# Patient Record
Sex: FEMALE | Race: BLACK OR AFRICAN AMERICAN | NOT HISPANIC OR LATINO | URBAN - METROPOLITAN AREA
[De-identification: names, ages, dates, MRNs, and addresses within clinical notes are randomized per-mention and may not be internally consistent; named-entity substitution may affect disease eponyms.]

---

## 2017-03-02 ENCOUNTER — EMERGENCY (EMERGENCY)
Facility: HOSPITAL | Age: 52
LOS: 1 days | Discharge: HOME | End: 2017-03-02
Attending: EMERGENCY MEDICINE
Payer: COMMERCIAL

## 2017-03-02 VITALS
OXYGEN SATURATION: 98 % | WEIGHT: 199.96 LBS | HEART RATE: 80 BPM | SYSTOLIC BLOOD PRESSURE: 117 MMHG | RESPIRATION RATE: 18 BRPM | DIASTOLIC BLOOD PRESSURE: 75 MMHG | HEIGHT: 64 IN | TEMPERATURE: 98 F

## 2017-03-02 DIAGNOSIS — M25.561 PAIN IN RIGHT KNEE: ICD-10-CM

## 2017-03-02 PROCEDURE — 99283 EMERGENCY DEPT VISIT LOW MDM: CPT | Mod: 25

## 2017-03-02 PROCEDURE — 73562 X-RAY EXAM OF KNEE 3: CPT

## 2017-03-02 PROCEDURE — 73562 X-RAY EXAM OF KNEE 3: CPT | Mod: 26,RT

## 2017-03-02 NOTE — ED PROVIDER NOTE - OBJECTIVE STATEMENT
52 y/o female states she was working out two days ago when she felt "pain to her right knee," immediately began experiencing swelling to area which has worsened since then. Denies CP/SOB/calf pain/recent travel. Denies any other complaints.

## 2017-03-02 NOTE — ED PROVIDER NOTE - MUSCULOSKELETAL, MLM
Spine appears normal, range of motion is not limited, no muscle or joint tenderness. right knee- + swelling and tenderness noted over superior lateral aspect, decreased ROM secondary to pain, no calf swelling or tenderness

## 2017-03-02 NOTE — ED PROVIDER NOTE - PROGRESS NOTE DETAILS
results discussed with pt, cx on RICE, motrin/apap prn, f.u with ortho. If any symptoms worsen or any new symptoms occur, return to ED, pt verbalized understanding.

## 2018-01-04 NOTE — ED ADULT NURSE NOTE - IS THE PATIENT ABLE TO BE SCREENED?
Denies known Latex allergy or symptoms of Latex sensitivity.  Medications verified, no changes.  Tobacco use verified.  Pt had total knee replacement 5 wks ago and states to be doing well.      Knees buckled last Friday,felt faint,states she hit her face on the counter at home and cut her left eyebrow.    Blood counts are low,iron is extremely low.  C/o weakness.    Health Maintenance Summary     Topic Due On Due Status Completed On Postpone Until Reason    MAMMOGRAM - BREAST CANCER SCREENING May 21, 2015 Overdue       Immunization - TDAP Pregnancy  Hidden       IMMUNIZATION - DTaP/Tdap/Td May 21, 1979 Overdue       Immunization-Influenza Sep 1, 2017 Postponed Oct 27, 2015 Oct 4, 2018 Patient Refused    Depression Screening Dec 14, 2018 Not Due Dec 14, 2017      Hepatitis C Screening May 21, 2011 Overdue             Reviewed overdue health maintenance topics with patient.               Yes

## 2018-06-29 ENCOUNTER — EMERGENCY (EMERGENCY)
Facility: HOSPITAL | Age: 53
LOS: 1 days | Discharge: LEFT BEFORE DISCHARGE | End: 2018-06-29

## 2018-06-29 VITALS — HEIGHT: 64 IN | WEIGHT: 199.96 LBS

## 2018-06-30 ENCOUNTER — EMERGENCY (EMERGENCY)
Facility: HOSPITAL | Age: 53
LOS: 0 days | Discharge: HOME | End: 2018-06-30
Attending: EMERGENCY MEDICINE | Admitting: EMERGENCY MEDICINE
Payer: COMMERCIAL

## 2018-06-30 VITALS
RESPIRATION RATE: 16 BRPM | HEART RATE: 68 BPM | TEMPERATURE: 98 F | DIASTOLIC BLOOD PRESSURE: 72 MMHG | SYSTOLIC BLOOD PRESSURE: 121 MMHG

## 2018-06-30 VITALS — WEIGHT: 199.96 LBS

## 2018-06-30 DIAGNOSIS — M62.830 MUSCLE SPASM OF BACK: ICD-10-CM

## 2018-06-30 DIAGNOSIS — R10.9 UNSPECIFIED ABDOMINAL PAIN: ICD-10-CM

## 2018-06-30 LAB
ALBUMIN SERPL ELPH-MCNC: 3.8 G/DL — SIGNIFICANT CHANGE UP (ref 3.3–5)
ALP SERPL-CCNC: 55 U/L — SIGNIFICANT CHANGE UP (ref 40–120)
ALT FLD-CCNC: 27 U/L — SIGNIFICANT CHANGE UP (ref 12–78)
ANION GAP SERPL CALC-SCNC: 10 MMOL/L — SIGNIFICANT CHANGE UP (ref 5–17)
APPEARANCE UR: ABNORMAL
AST SERPL-CCNC: 33 U/L — SIGNIFICANT CHANGE UP (ref 15–37)
BASOPHILS # BLD AUTO: 0.01 K/UL — SIGNIFICANT CHANGE UP (ref 0–0.2)
BASOPHILS NFR BLD AUTO: 0.2 % — SIGNIFICANT CHANGE UP (ref 0–2)
BILIRUB SERPL-MCNC: 0.6 MG/DL — SIGNIFICANT CHANGE UP (ref 0.2–1.2)
BILIRUB UR-MCNC: NEGATIVE — SIGNIFICANT CHANGE UP
BUN SERPL-MCNC: 12 MG/DL — SIGNIFICANT CHANGE UP (ref 7–23)
CALCIUM SERPL-MCNC: 9.7 MG/DL — SIGNIFICANT CHANGE UP (ref 8.5–10.1)
CHLORIDE SERPL-SCNC: 102 MMOL/L — SIGNIFICANT CHANGE UP (ref 96–108)
CO2 SERPL-SCNC: 26 MMOL/L — SIGNIFICANT CHANGE UP (ref 22–31)
COLOR SPEC: YELLOW — SIGNIFICANT CHANGE UP
COMMENT - URINE: SIGNIFICANT CHANGE UP
CREAT SERPL-MCNC: 1.04 MG/DL — SIGNIFICANT CHANGE UP (ref 0.5–1.3)
DIFF PNL FLD: ABNORMAL
EOSINOPHIL # BLD AUTO: 0 K/UL — SIGNIFICANT CHANGE UP (ref 0–0.5)
EOSINOPHIL NFR BLD AUTO: 0 % — SIGNIFICANT CHANGE UP (ref 0–6)
EPI CELLS # UR: SIGNIFICANT CHANGE UP
GLUCOSE SERPL-MCNC: 146 MG/DL — HIGH (ref 70–99)
GLUCOSE UR QL: NEGATIVE MG/DL — SIGNIFICANT CHANGE UP
HCT VFR BLD CALC: 45.2 % — HIGH (ref 34.5–45)
HGB BLD-MCNC: 14.3 G/DL — SIGNIFICANT CHANGE UP (ref 11.5–15.5)
IMM GRANULOCYTES NFR BLD AUTO: 0.5 % — SIGNIFICANT CHANGE UP (ref 0–1.5)
KETONES UR-MCNC: ABNORMAL
LEUKOCYTE ESTERASE UR-ACNC: NEGATIVE — SIGNIFICANT CHANGE UP
LYMPHOCYTES # BLD AUTO: 0.97 K/UL — LOW (ref 1–3.3)
LYMPHOCYTES # BLD AUTO: 16.1 % — SIGNIFICANT CHANGE UP (ref 13–44)
MCHC RBC-ENTMCNC: 25.7 PG — LOW (ref 27–34)
MCHC RBC-ENTMCNC: 31.6 GM/DL — LOW (ref 32–36)
MCV RBC AUTO: 81.1 FL — SIGNIFICANT CHANGE UP (ref 80–100)
MONOCYTES # BLD AUTO: 0.05 K/UL — SIGNIFICANT CHANGE UP (ref 0–0.9)
MONOCYTES NFR BLD AUTO: 0.8 % — LOW (ref 2–14)
NEUTROPHILS # BLD AUTO: 4.97 K/UL — SIGNIFICANT CHANGE UP (ref 1.8–7.4)
NEUTROPHILS NFR BLD AUTO: 82.4 % — HIGH (ref 43–77)
NITRITE UR-MCNC: NEGATIVE — SIGNIFICANT CHANGE UP
NRBC # BLD: 0 /100 WBCS — SIGNIFICANT CHANGE UP (ref 0–0)
PH UR: 6 — SIGNIFICANT CHANGE UP (ref 5–8)
PLATELET # BLD AUTO: 329 K/UL — SIGNIFICANT CHANGE UP (ref 150–400)
POTASSIUM SERPL-MCNC: 4 MMOL/L — SIGNIFICANT CHANGE UP (ref 3.5–5.3)
POTASSIUM SERPL-SCNC: 4 MMOL/L — SIGNIFICANT CHANGE UP (ref 3.5–5.3)
PROT SERPL-MCNC: 8.5 GM/DL — HIGH (ref 6–8.3)
PROT UR-MCNC: 30 MG/DL
RBC # BLD: 5.57 M/UL — HIGH (ref 3.8–5.2)
RBC # FLD: 13.6 % — SIGNIFICANT CHANGE UP (ref 10.3–14.5)
RBC CASTS # UR COMP ASSIST: SIGNIFICANT CHANGE UP /HPF (ref 0–4)
SODIUM SERPL-SCNC: 138 MMOL/L — SIGNIFICANT CHANGE UP (ref 135–145)
SP GR SPEC: 1.02 — SIGNIFICANT CHANGE UP (ref 1.01–1.02)
UROBILINOGEN FLD QL: NEGATIVE MG/DL — SIGNIFICANT CHANGE UP
WBC # BLD: 6.03 K/UL — SIGNIFICANT CHANGE UP (ref 3.8–10.5)
WBC # FLD AUTO: 6.03 K/UL — SIGNIFICANT CHANGE UP (ref 3.8–10.5)
WBC UR QL: NEGATIVE — SIGNIFICANT CHANGE UP

## 2018-06-30 PROCEDURE — 99284 EMERGENCY DEPT VISIT MOD MDM: CPT | Mod: 25

## 2018-06-30 PROCEDURE — 74176 CT ABD & PELVIS W/O CONTRAST: CPT | Mod: 26

## 2018-06-30 RX ORDER — KETOROLAC TROMETHAMINE 30 MG/ML
30 SYRINGE (ML) INJECTION ONCE
Qty: 0 | Refills: 0 | Status: DISCONTINUED | OUTPATIENT
Start: 2018-06-30 | End: 2018-06-30

## 2018-06-30 RX ORDER — SODIUM CHLORIDE 9 MG/ML
1000 INJECTION INTRAMUSCULAR; INTRAVENOUS; SUBCUTANEOUS ONCE
Qty: 0 | Refills: 0 | Status: COMPLETED | OUTPATIENT
Start: 2018-06-30 | End: 2018-06-30

## 2018-06-30 RX ORDER — SODIUM CHLORIDE 9 MG/ML
3 INJECTION INTRAMUSCULAR; INTRAVENOUS; SUBCUTANEOUS ONCE
Qty: 0 | Refills: 0 | Status: DISCONTINUED | OUTPATIENT
Start: 2018-06-30 | End: 2018-06-30

## 2018-06-30 RX ORDER — MAGNESIUM CHLORIDE
0 CRYSTALS MISCELLANEOUS
Qty: 0 | Refills: 0 | COMMUNITY

## 2018-06-30 RX ORDER — SODIUM CHLORIDE 9 MG/ML
1000 INJECTION INTRAMUSCULAR; INTRAVENOUS; SUBCUTANEOUS ONCE
Qty: 0 | Refills: 0 | Status: DISCONTINUED | OUTPATIENT
Start: 2018-06-30 | End: 2018-06-30

## 2018-06-30 RX ORDER — CYCLOBENZAPRINE HYDROCHLORIDE 10 MG/1
1 TABLET, FILM COATED ORAL
Qty: 21 | Refills: 0 | OUTPATIENT
Start: 2018-06-30 | End: 2018-07-06

## 2018-06-30 RX ADMIN — SODIUM CHLORIDE 1000 MILLILITER(S): 9 INJECTION INTRAMUSCULAR; INTRAVENOUS; SUBCUTANEOUS at 08:11

## 2018-06-30 RX ADMIN — Medication 30 MILLIGRAM(S): at 08:11

## 2018-06-30 NOTE — ED PROVIDER NOTE - OBJECTIVE STATEMENT
54 yo female pw left flank pain for 2 dasy no fever or chills, no dysuria. no ho traumatic injury 54 yo female pw left flank pain for 2 dasy no fever or chills, no dysuria. no ho traumatic injury. pt was triaged in [facility] last night for the same complaint but left wtihout being seen by the doctor. pt took motrin and flexeril with relief of pain then return of pain today

## 2018-06-30 NOTE — ED PROVIDER NOTE - CROS ED ROS STATEMENT
Patient is a 71y old  Female who presents with a chief complaint of shortness of breath (2018 21:46)      SUBJECTIVE / OVERNIGHT EVENTS: lethargic laboured breathing.  Review of Systems  unobtainable     MEDICATIONS  (STANDING):  albumin human 25% IVPB 100 milliLiter(s) IV Intermittent every 6 hours  ALBUTerol/ipratropium for Nebulization 3 milliLiter(s) Nebulizer every 6 hours  buDESOnide   0.5 milliGRAM(s) Respule 0.5 milliGRAM(s) Inhalation every 12 hours  cefepime  IVPB 1000 milliGRAM(s) IV Intermittent every 12 hours  dextrose 5%. 1000 milliLiter(s) (50 mL/Hr) IV Continuous <Continuous>  dextrose 50% Injectable 12.5 Gram(s) IV Push once  dextrose 50% Injectable 25 Gram(s) IV Push once  dextrose 50% Injectable 25 Gram(s) IV Push once  docusate sodium 100 milliGRAM(s) Oral three times a day  escitalopram 20 milliGRAM(s) Oral daily  furosemide   Injectable 60 milliGRAM(s) IV Push daily  insulin glargine Injectable (LANTUS) 32 Unit(s) SubCutaneous at bedtime  insulin lispro (HumaLOG) corrective regimen sliding scale   SubCutaneous every 6 hours  methylPREDNISolone sodium succinate Injectable 125 milliGRAM(s) IV Push once  midodrine 10 milliGRAM(s) Oral every 8 hours  nystatin Powder 1 Application(s) Topical two times a day  pantoprazole    Tablet 40 milliGRAM(s) Oral before breakfast  predniSONE   Tablet 50 milliGRAM(s) Oral daily  vancomycin  IVPB 1000 milliGRAM(s) IV Intermittent once    MEDICATIONS  (PRN):  dextrose Gel 1 Dose(s) Oral once PRN Blood Glucose LESS THAN 70 milliGRAM(s)/deciliter  glucagon  Injectable 1 milliGRAM(s) IntraMuscular once PRN Glucose LESS THAN 70 milligrams/deciliter  ondansetron Injectable 4 milliGRAM(s) IV Push every 6 hours PRN Nausea and/or Vomiting  polyethylene glycol 3350 17 Gram(s) Oral at bedtime PRN Constipation  senna 2 Tablet(s) Oral at bedtime PRN Constipation  sodium chloride 0.65% Nasal 1 Spray(s) Both Nostrils daily PRN Nasal Congestion          PHYSICAL EXAM:  GENERAL: NAD, well-developed  HEAD:  Atraumatic, Normocephalic  EYES: EOMI, PERRLA, conjunctiva and sclera jaundiced  NECK: Supple, No JVD  CHEST/LUNG: few wheezes and bilateral basal crackles  HEART:tachycardic  ABDOMEN: Soft, Nontender, Nondistended; Bowel sounds present  EXTREMITIES:  2+ Peripheral Pulses, No clubbing, cyanosis,3+edema  PSYCH: AAOx3  NEUROLOGY: non-focal  SKIN: No rashes or lesions    LABS:                        8.7    7.4   )-----------( 38       ( 2018 07:33 )             27.1     02-    147<H>  |  101  |  126<H>  ----------------------------<  136<H>  3.6   |  24  |  2.50<H>    Ca    9.7      2018 07:33    TPro  5.4<L>  /  Alb  3.8  /  TBili  10.3<H>  /  DBili  x   /  AST  88<H>  /  ALT  46<H>  /  AlkPhos  156<H>            Urinalysis Basic - ( 2018 16:39 )    Color: Yellow / Appearance: Clear / S.012 / pH: x  Gluc: x / Ketone: Negative  / Bili: Negative / Urobili: Negative   Blood: x / Protein: Trace / Nitrite: Negative   Leuk Esterase: Negative / RBC: 0-2 /HPF / WBC 2-5 /HPF   Sq Epi: x / Non Sq Epi: Occasional /HPF / Bacteria: Few /HPF        RADIOLOGY & ADDITIONAL TESTS:    Imaging Personally Reviewed:    Consultant(s) Notes Reviewed:      Care Discussed with Consultants/Other Providers: all other ROS negative except as per HPI

## 2018-06-30 NOTE — ED PROVIDER NOTE - MUSCULOSKELETAL, MLM
Spine appears normal, range of motion is not limited, left sided flank pain with cvat, spasm to left paralumbar area no rash

## 2018-06-30 NOTE — ED ADULT TRIAGE NOTE - CHIEF COMPLAINT QUOTE
Pt c/o left flank pain since yesterday, pt seen at [facility] yesterday and signed out AMA afterreceiving motrin. Pt states she had no test performed. pt denies urinary symptoms, fever, fall or trauma

## 2018-06-30 NOTE — ED ADULT NURSE NOTE - CHIEF COMPLAINT QUOTE
Pt c/o left flank pain since yesterday, pt seen at [facility] yesterday and signed out AMA afterreceiving motrin. Pt states she had no test performed. pt denies urinary symptoms, fever, fall or trauma

## 2018-06-30 NOTE — ED ADULT NURSE NOTE - OBJECTIVE STATEMENT
pt c/o left mid back and flank pain x1 day, worse with movement, pt reports relief after taking flexeril and ibuprofen

## 2021-02-18 ENCOUNTER — EMERGENCY (EMERGENCY)
Facility: HOSPITAL | Age: 56
LOS: 1 days | Discharge: HOME | End: 2021-02-18
Attending: EMERGENCY MEDICINE
Payer: COMMERCIAL

## 2021-02-18 VITALS
OXYGEN SATURATION: 95 % | HEART RATE: 77 BPM | WEIGHT: 199.96 LBS | DIASTOLIC BLOOD PRESSURE: 90 MMHG | RESPIRATION RATE: 20 BRPM | TEMPERATURE: 98 F | HEIGHT: 72 IN | SYSTOLIC BLOOD PRESSURE: 144 MMHG

## 2021-02-18 PROCEDURE — 99285 EMERGENCY DEPT VISIT HI MDM: CPT

## 2021-02-18 PROCEDURE — 93010 ELECTROCARDIOGRAM REPORT: CPT

## 2021-02-18 RX ORDER — ASPIRIN/CALCIUM CARB/MAGNESIUM 324 MG
162 TABLET ORAL ONCE
Refills: 0 | Status: COMPLETED | OUTPATIENT
Start: 2021-02-18 | End: 2021-02-18

## 2021-02-18 NOTE — ED ADULT TRIAGE NOTE - CHIEF COMPLAINT QUOTE
patient states that she has chest pressure/ tightness with SOB and lower back pain, unknown covid exposure

## 2021-02-18 NOTE — ED PROVIDER NOTE - ATTENDING CONTRIBUTION TO CARE
Exertional chest pain concerning for anginal pain, no recent cardiac work up. ECG with incomplete RBBB, no other obvious ischemic changes. PE ruled out by CTA. Initial troponin negative. Given suspicious nature of presentation will hold on ED obs for serial enzymes, tele monitoring and cardiology consult in the AM.

## 2021-02-18 NOTE — ED PROVIDER NOTE - OBJECTIVE STATEMENT
56yo F no pmhx presents to ED c/o chest pressure. Pt states she was outside shoveling snow this evening, started feeling mid-sternal chest pressure after coming inside. States she gets this pressure from time to time but became concerned tonight when her sister told her to check her pulse ox and it was reading low 90s. Pressure is non-radiating. Pt also states she tested positive for covid on 1/26, was asymptomatic and has been feeling well but sister told her she should come to ED because of risk for blood clot. States she went to [facility] and got aspirin took 162mg prior to arrival. Contrary to triage note pt does not feel short of breath. States back pain feels "achy" from shoveling. Denies sob, GARCIA, palpitations, diaphoresis, dizziness, ha, n/v. 54yo F no pmhx presents to ED c/o chest pressure. Pt states she was outside shoveling snow this evening, started feeling mid-sternal chest pressure after coming inside. States she gets this pressure from time to time but became concerned tonight when her sister told her to check her pulse ox and it was reading low 90s. Pressure is non-radiating. Pt also states she tested positive for covid on 1/26, was asymptomatic and has been feeling well but sister told her she should come to ED because of risk for blood clot. States she went to [facility] and got aspirin took 162mg prior to arrival. Contrary to triage note pt does not feel short of breath. Past couple days has been noting GARCIA, typically when going up stairs. States back pain feels "achy" from shoveling. Denies sob, palpitations, diaphoresis, dizziness, ha, n/v.

## 2021-02-18 NOTE — ED PROVIDER NOTE - PROGRESS NOTE DETAILS
CTA negative for PE, trop negative. Will keep pt in obs for serial enzymes, ekgs and [facility] cardiology consult. pt agreeable to stay

## 2021-02-18 NOTE — ED PROVIDER NOTE - PHYSICAL EXAMINATION
Gen: No acute distress, non toxic  HEENT: Mucous membranes moist, pink conjunctivae, EOMI  CV: RRR, nl s1/s2.  Resp: O2 100% on Room air. CTAB, normal rate and effort  GI: Abdomen soft, NT, ND. No rebound, no guarding  Neuro: A&O x 3, moving all 4 extremities  MSK: No spine or joint tenderness to palpation  Skin: No rashes. intact and perfused.

## 2021-02-18 NOTE — ED PROVIDER NOTE - CLINICAL SUMMARY MEDICAL DECISION MAKING FREE TEXT BOX
54yo F with intermittent chest pressure for past 1-2 days, worse this evening after shoveling snow. Concern for acs given symptoms, plan to check labs including trop, d-dimer due to pt's recent covid infx and cxr. initial ekg showed prolonged , will repeat.

## 2021-02-19 VITALS
SYSTOLIC BLOOD PRESSURE: 130 MMHG | OXYGEN SATURATION: 99 % | DIASTOLIC BLOOD PRESSURE: 88 MMHG | TEMPERATURE: 98 F | RESPIRATION RATE: 16 BRPM | HEART RATE: 73 BPM

## 2021-02-19 LAB
ALBUMIN SERPL ELPH-MCNC: 3.8 G/DL — SIGNIFICANT CHANGE UP (ref 3.3–5.2)
ALP SERPL-CCNC: 65 U/L — SIGNIFICANT CHANGE UP (ref 40–120)
ALT FLD-CCNC: 16 U/L — SIGNIFICANT CHANGE UP
ANION GAP SERPL CALC-SCNC: 11 MMOL/L — SIGNIFICANT CHANGE UP (ref 5–17)
AST SERPL-CCNC: 25 U/L — SIGNIFICANT CHANGE UP
BASOPHILS # BLD AUTO: 0.04 K/UL — SIGNIFICANT CHANGE UP (ref 0–0.2)
BASOPHILS NFR BLD AUTO: 0.5 % — SIGNIFICANT CHANGE UP (ref 0–2)
BILIRUB SERPL-MCNC: 0.2 MG/DL — LOW (ref 0.4–2)
BUN SERPL-MCNC: 16 MG/DL — SIGNIFICANT CHANGE UP (ref 8–20)
CALCIUM SERPL-MCNC: 9.4 MG/DL — SIGNIFICANT CHANGE UP (ref 8.6–10.2)
CHLORIDE SERPL-SCNC: 101 MMOL/L — SIGNIFICANT CHANGE UP (ref 98–107)
CO2 SERPL-SCNC: 27 MMOL/L — SIGNIFICANT CHANGE UP (ref 22–29)
CREAT SERPL-MCNC: 0.76 MG/DL — SIGNIFICANT CHANGE UP (ref 0.5–1.3)
D DIMER BLD IA.RAPID-MCNC: 266 NG/ML DDU — HIGH
EOSINOPHIL # BLD AUTO: 0.3 K/UL — SIGNIFICANT CHANGE UP (ref 0–0.5)
EOSINOPHIL NFR BLD AUTO: 4.1 % — SIGNIFICANT CHANGE UP (ref 0–6)
GLUCOSE SERPL-MCNC: 114 MG/DL — HIGH (ref 70–99)
HCG SERPL-ACNC: <4 MIU/ML — SIGNIFICANT CHANGE UP
HCT VFR BLD CALC: 41.5 % — SIGNIFICANT CHANGE UP (ref 34.5–45)
HGB BLD-MCNC: 12.6 G/DL — SIGNIFICANT CHANGE UP (ref 11.5–15.5)
IMM GRANULOCYTES NFR BLD AUTO: 0.5 % — SIGNIFICANT CHANGE UP (ref 0–1.5)
LIDOCAIN IGE QN: 37 U/L — SIGNIFICANT CHANGE UP (ref 22–51)
LYMPHOCYTES # BLD AUTO: 2.98 K/UL — SIGNIFICANT CHANGE UP (ref 1–3.3)
LYMPHOCYTES # BLD AUTO: 40.5 % — SIGNIFICANT CHANGE UP (ref 13–44)
MAGNESIUM SERPL-MCNC: 2.1 MG/DL — SIGNIFICANT CHANGE UP (ref 1.6–2.6)
MCHC RBC-ENTMCNC: 25.9 PG — LOW (ref 27–34)
MCHC RBC-ENTMCNC: 30.4 GM/DL — LOW (ref 32–36)
MCV RBC AUTO: 85.2 FL — SIGNIFICANT CHANGE UP (ref 80–100)
MONOCYTES # BLD AUTO: 0.55 K/UL — SIGNIFICANT CHANGE UP (ref 0–0.9)
MONOCYTES NFR BLD AUTO: 7.5 % — SIGNIFICANT CHANGE UP (ref 2–14)
NEUTROPHILS # BLD AUTO: 3.45 K/UL — SIGNIFICANT CHANGE UP (ref 1.8–7.4)
NEUTROPHILS NFR BLD AUTO: 46.9 % — SIGNIFICANT CHANGE UP (ref 43–77)
PLATELET # BLD AUTO: 260 K/UL — SIGNIFICANT CHANGE UP (ref 150–400)
POTASSIUM SERPL-MCNC: 4.3 MMOL/L — SIGNIFICANT CHANGE UP (ref 3.5–5.3)
POTASSIUM SERPL-SCNC: 4.3 MMOL/L — SIGNIFICANT CHANGE UP (ref 3.5–5.3)
PROT SERPL-MCNC: 7.2 G/DL — SIGNIFICANT CHANGE UP (ref 6.6–8.7)
RBC # BLD: 4.87 M/UL — SIGNIFICANT CHANGE UP (ref 3.8–5.2)
RBC # FLD: 14.5 % — SIGNIFICANT CHANGE UP (ref 10.3–14.5)
SODIUM SERPL-SCNC: 139 MMOL/L — SIGNIFICANT CHANGE UP (ref 135–145)
TROPONIN T SERPL-MCNC: <0.01 NG/ML — SIGNIFICANT CHANGE UP (ref 0–0.06)
WBC # BLD: 7.36 K/UL — SIGNIFICANT CHANGE UP (ref 3.8–10.5)
WBC # FLD AUTO: 7.36 K/UL — SIGNIFICANT CHANGE UP (ref 3.8–10.5)

## 2021-02-19 PROCEDURE — 85025 COMPLETE CBC W/AUTO DIFF WBC: CPT

## 2021-02-19 PROCEDURE — G1004: CPT

## 2021-02-19 PROCEDURE — 84484 ASSAY OF TROPONIN QUANT: CPT

## 2021-02-19 PROCEDURE — 93017 CV STRESS TEST TRACING ONLY: CPT

## 2021-02-19 PROCEDURE — 93306 TTE W/DOPPLER COMPLETE: CPT

## 2021-02-19 PROCEDURE — 71275 CT ANGIOGRAPHY CHEST: CPT | Mod: 26,ME

## 2021-02-19 PROCEDURE — 78452 HT MUSCLE IMAGE SPECT MULT: CPT

## 2021-02-19 PROCEDURE — 93018 CV STRESS TEST I&R ONLY: CPT

## 2021-02-19 PROCEDURE — G0378: CPT

## 2021-02-19 PROCEDURE — 93016 CV STRESS TEST SUPVJ ONLY: CPT

## 2021-02-19 PROCEDURE — 93010 ELECTROCARDIOGRAM REPORT: CPT | Mod: 76

## 2021-02-19 PROCEDURE — 36415 COLL VENOUS BLD VENIPUNCTURE: CPT

## 2021-02-19 PROCEDURE — 78452 HT MUSCLE IMAGE SPECT MULT: CPT | Mod: 26

## 2021-02-19 PROCEDURE — 93005 ELECTROCARDIOGRAM TRACING: CPT

## 2021-02-19 PROCEDURE — A9500: CPT

## 2021-02-19 PROCEDURE — 93306 TTE W/DOPPLER COMPLETE: CPT | Mod: 26

## 2021-02-19 PROCEDURE — 99284 EMERGENCY DEPT VISIT MOD MDM: CPT | Mod: 25

## 2021-02-19 PROCEDURE — 99236 HOSP IP/OBS SAME DATE HI 85: CPT

## 2021-02-19 PROCEDURE — 84702 CHORIONIC GONADOTROPIN TEST: CPT

## 2021-02-19 PROCEDURE — 71046 X-RAY EXAM CHEST 2 VIEWS: CPT | Mod: 26

## 2021-02-19 PROCEDURE — 71275 CT ANGIOGRAPHY CHEST: CPT

## 2021-02-19 PROCEDURE — 99243 OFF/OP CNSLTJ NEW/EST LOW 30: CPT

## 2021-02-19 PROCEDURE — 36000 PLACE NEEDLE IN VEIN: CPT

## 2021-02-19 PROCEDURE — 71046 X-RAY EXAM CHEST 2 VIEWS: CPT

## 2021-02-19 PROCEDURE — 80053 COMPREHEN METABOLIC PANEL: CPT

## 2021-02-19 PROCEDURE — 85379 FIBRIN DEGRADATION QUANT: CPT

## 2021-02-19 PROCEDURE — 83735 ASSAY OF MAGNESIUM: CPT

## 2021-02-19 PROCEDURE — 83690 ASSAY OF LIPASE: CPT

## 2021-02-19 RX ADMIN — Medication 162 MILLIGRAM(S): at 00:28

## 2021-02-19 NOTE — ED CDU PROVIDER DISPOSITION NOTE - PATIENT PORTAL LINK FT
You can access the FollowMyHealth Patient Portal offered by [facility] by registering at the following website: [email or web address]. By joining [employer]’s FollowMyHealth portal, you will also be able to view your health information using other applications (apps) compatible with our system.

## 2021-02-19 NOTE — ED CDU PROVIDER DISPOSITION NOTE - NSFOLLOWUPINSTRUCTIONS_ED_ALL_ED_FT
- follow up with Dr Hines in 1-2 weeks    - Your CT scan showed enlarged left axillary lymph nodes, you need to make an appointment for a mammogram and follow up with your medical doctor in 2-3 days.

## 2021-02-19 NOTE — ED ADULT NURSE REASSESSMENT NOTE - NS ED NURSE REASSESS COMMENT FT1
Patient pending NST read. Patient remains NPO pending results as per orders. VSS. Patient in NAD. Will continue to monitor.
Received patient from [facility] RN RN.  Pt AxO4, VSS.  Pt denies chest pain/SOB at this time.  Cardio NSR on cardiac monitor. Left AC   IV insertion site with Angio cath #20 intact , flushing without difficulty. Currently denies chest pain or any pressure, denies SOB ,palpitation /n/v or dizziness.  Safety measures taken, bed in low position, call bell within reach, side rails up x2.  Plan of care explained.  Pt verbalized understanding.  Will continue to monitor.
Assumed care of the patient at 0730. Verbal report received from Gloria MANLEY Obs. Patient A&Ox4. No s/s of acute distress. chest pressure subsided at rest, intermittent Chest pressure while ambulating only. Denies SOB or dizziness. VSS. NSR on CM. PIV patent, +blood return. Patient ambulatory with steady gait. Patient remains NPO pending TTE and NST testing. Patient in understanding of plan of care. Patient with no further questions for the RN. Resting in comfort. Call bell within reach and encouraged to use when assistance needed. Will continue to monitor.

## 2021-02-19 NOTE — ED CDU PROVIDER DISPOSITION NOTE - CLINICAL COURSE
54yo F no pmhx presents to ED c/o chest pressure. Pt states she was outside shoveling snow this evening, started feeling mid-sternal chest pressure after coming inside. States she gets this pressure from time to time but became concerned tonight when her sister told her to check her pulse ox and it was reading low 90s. Pressure is non-radiating. Pt also states she tested positive for covid on 1/26, was asymptomatic and has been feeling well but sister told her she should come to ED because of risk for blood clot. States she went to [facility] and got aspirin took 162mg prior to arrival. Contrary to triage note pt does not feel short of breath. Past couple days has been noting GARCIA, typically when going up stairs. States back pain feels "achy" from shoveling. Denies sob, palpitations, diaphoresis, dizziness, ha, n/v.  Troponin negative x 3, EKG without ischemia.  CTA chest was negative for PE.  Incidental finding of enlarged LN left axillary.  Patient states that she has a family hx of breast cancer, was getting mammograms routinely in her late teens and early 20's but has not had one in30 years".  I s/w Mayte Beaulieu NP of cardiology TTE and NST unremarkable, needs to f/u Dr Hines 1-2 weeks.

## 2021-02-19 NOTE — ED CDU PROVIDER INITIAL DAY NOTE - OBJECTIVE STATEMENT
54yo F no pmhx presents to ED c/o chest pressure. Pt states she was outside shoveling snow this evening, started feeling mid-sternal chest pressure after coming inside. States she gets this pressure from time to time but became concerned tonight when her sister told her to check her pulse ox and it was reading low 90s. Pressure is non-radiating. Pt also states she tested positive for covid on 1/26, was asymptomatic and has been feeling well but sister told her she should come to ED because of risk for blood clot. States she went to [facility] and got aspirin took 162mg prior to arrival. Contrary to triage note pt does not feel short of breath. Past couple days has been noting GARCIA, typically when going up stairs. States back pain feels "achy" from shoveling. Denies sob, palpitations, diaphoresis, dizziness, ha, n/v.

## 2021-02-19 NOTE — ED ADULT NURSE REASSESSMENT NOTE - COMFORT CARE
ambulated to bathroom/plan of care explained/side rails up/treatment delay explained/wait time explained/warm blanket provided
ambulated to bathroom/plan of care explained/repositioned/wait time explained

## 2021-02-19 NOTE — ED ADULT NURSE NOTE - OBJECTIVE STATEMENT
received pt alert and orientedx3, complaining of chest pain, labs sent, iV in place, vitals are stable, no chest pain at this time, sating well on room air, continue to monitor

## 2021-02-19 NOTE — ED CDU PROVIDER DISPOSITION NOTE - CARE PROVIDER_API CALL
Ike Baeza)  Cardiovascular Disease; Internal Medicine; Interventional Cardiology  [address] [place] [address]  Phone: [phone]  Fax: [phone]  Follow Up Time:

## 2021-02-19 NOTE — CONSULT NOTE ADULT - ASSESSMENT
Pt is a 54 y/o female with no known past medical history who presents to [facility]ED for chest pain. Pt states for past two days she has been experiencing chest pressure. Pt states pressure started after she shoveled, described as midsternal, constant, radiating intermittently to right side of back, 2-3/10, lasting for hours in duration, better with drinking hot tea. Pt states she has recently noticed with walking for long periods of time she becomes SOB. 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 She told her sister who was a nurse practitioner who told her to ambulate and observe if o2 sat increased which it did. Pt was then advised to seek emergent medical attention. In ED, D-dimer-226, Tropx3-negative, CTA Chest- negative for PE. Pt denies fevers, chills, cough, palpitations.       Chest Pain  -Tropx3-negative  -D-dimer-226  -ECG- NSR HR @ 66, with no ischemic changes  -CTA Chest-negative for PE, no consolidation or pleural effusion  -Pt has family hx of first degree relative (mother) who had MI @ age 30-40s, pt also has heart murmur (previously known as per patient)  -Echo-ordered and pending  -Plan for NST 2/19, please maintain NPO    Assessment and recommendations are final when note is signed by the attending.

## 2021-02-19 NOTE — CONSULT NOTE ADULT - SUBJECTIVE AND OBJECTIVE BOX
[facility]                                                               Office:  [address] [address]                                                              Telephone: [phone]. Fax:[phone]                                                                        CARDIOLOGY CONSULTATION NOTE                                                                                             Consult requested by:  PLACIDO Santiago  Reason for Consultation: Chest Pain  History obtained by: Patient and medical record   obtained: No    Chief complaint:    Patient is a 55y old  Female who presents with a chief complaint of chest pain      HPI: Pt is a 56 y/o female with no known past medical history who presents to [facility]ED for chest pain. Pt states for past two days she has been experiencing chest pressure. Pt states pressure started after she shoveled, described as midsternal, constant, radiating intermittently to right side of back, 2-3/10, lasting for hours in duration, better with drinking hot tea. Pt states she has recently noticed with walking for long periods of time she becomes SOB. 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 She told her sister who was a nurse practitioner who told her to ambulate and observe if o2 sat increased which it did. Pt was then advised to seek emergent medical attention. In ED, D-dimer-226, Tropx3-negative, CTA Chest- negative for PE.  Pt denies fevers, chills, cough, palpitations.         REVIEW OF SYMPTOMS:     CONSTITUTIONAL: No fever, weight loss, or fatigue  ENMT:  No difficulty hearing, tinnitus, vertigo; No sinus or throat pain  NECK: No pain or stiffness  CARDIOVASCULAR: See HPI  RESPIRATORY: No Dyspnea on exertion, Shortness of breath, cough, wheezing  : No dysuria, no hematuria   GI: No dark color stool, no melena, no diarrhea, no constipation, no abdominal pain   NEURO: No headache, no dizziness, no slurred speech   MUSCULOSKELETAL: No joint pain or swelling; No muscle, back, or extremity pain  PSYCH: No agitation, no anxiety.    ALL OTHER REVIEW OF SYSTEMS ARE NEGATIVE.      PREVIOUS DIAGNOSTIC TESTING  ECHO FINDINGS: pending      STRESS FINDINGS: Asp er patient 5-6 years ago-normal      ALLERGIES: Allergies    No Known Allergies    Intolerances          PAST MEDICAL HISTORY  No pertinent past medical history        PAST SURGICAL HISTORY  No significant past surgical history        FAMILY HISTORY:  Mother-MI age 30-40s         SOCIAL HISTORY:  Denies smoking/alcohol/drugs      CURRENT MEDICATIONS:           HOME MEDICATIONS:    magnesium chloride 64 mg oral tablet, extended release:  (30 Jun 2018 08:15)      Vital Signs Last 24 Hrs  T(C): 36.4 (19 Feb 2021 06:36), Max: 36.9 (18 Feb 2021 23:25)  T(F): 97.5 (19 Feb 2021 06:36), Max: 98.4 (18 Feb 2021 23:25)  HR: 55 (19 Feb 2021 06:36) (55 - 77)  BP: 106/62 (19 Feb 2021 06:36) (106/62 - 144/90)  RR: 19 (19 Feb 2021 06:36) (19 - 20)  SpO2: 98% (19 Feb 2021 06:36) (95% - 98%)      PHYSICAL EXAM:  Constitutional: Comfortable . No acute distress.   HEENT: Atraumatic and normocephalic , neck is supple . no JVD. No carotid bruit. PEERL   CNS: A&Ox3. No focal deficits. EOMI.   Lymph Nodes: Cervical : Not palpable.  Respiratory: CTAB  Cardiovascular: S1S2 RRR. +sys. murmur grade III/VI 2nd intercostal space  Gastrointestinal: Soft non-tender and non distended . +Bowel sounds. negative Carter's sign.  Extremities: No edema.   Psychiatric: Calm . no agitation.  Skin: No skin rash/ulcers visualized to face, hands or feet.    Intake and output:     LABS:                        12.6   7.36  )-----------( 260      ( 19 Feb 2021 00:26 )             41.5     02-19    139  |  101  |  16.0  ----------------------------<  114<H>  4.3   |  27.0  |  0.76    Ca    9.4      19 Feb 2021 00:26  Mg     2.1     02-19    TPro  7.2  /  Alb  3.8  /  TBili  0.2<L>  /  DBili  x   /  AST  25  /  ALT  16  /  AlkPhos  65  02-19    CARDIAC MARKERS ( 19 Feb 2021 07:10 )  x     / <0.01 ng/mL / x     / x     / x      CARDIAC MARKERS ( 19 Feb 2021 03:25 )  x     / <0.01 ng/mL / x     / x     / x      CARDIAC MARKERS ( 19 Feb 2021 00:26 )  x     / <0.01 ng/mL / x     / x     / x        ;p-BNP=        INTERPRETATION OF TELEMETRY: SB/SR HR @ 50-80s  ECG: NSR HR @ 66    RADIOLOGY & ADDITIONAL STUDIES:    X-ray:   CT scan: < from: CT Angio Chest w/ IV Cont (02.19.21 @ 02:00) >  IMPRESSION:    No pulmonary embolism.    No consolidation or pleural effusion.    Prominent left axillary lymph nodes, largest measuring 2.1 x 1.6 cm. Recommend clinical correlation.

## 2021-02-19 NOTE — ED CDU PROVIDER INITIAL DAY NOTE - MEDICAL DECISION MAKING DETAILS
Pt with mid-sternal chest pressure, GARCIA. EKG with partial rbbb. CTA was negative for PE, trop negative x 1. Given exertional nature of symptoms pt to remain in observation for serial enzymes/ekg and [facility] cardiology consult in AM

## 2021-02-19 NOTE — ED CDU PROVIDER DISPOSITION NOTE - ATTENDING CONTRIBUTION TO CARE
I agree with the PA's note and was available for any issues/concerns. I was directly involved in patient care. My brief overall assessment is as follows: pt withotu symtoms, cleared by cards for outpt f/u. given results and instructions. f/u cards.

## 2021-02-19 NOTE — ED ADULT NURSE NOTE - CAS ELECT INFOMATION PROVIDED
DC instructions provided and reviewed to the patient. Patient in understanding of all dc instructions. No further questions for the RN regarding dc. VSS. Ambulatory./DC instructions

## 2022-02-23 ENCOUNTER — OUTPATIENT (OUTPATIENT)
Dept: OUTPATIENT SERVICES | Facility: HOSPITAL | Age: 57
LOS: 1 days | End: 2022-02-23
Payer: COMMERCIAL

## 2022-02-23 ENCOUNTER — APPOINTMENT (OUTPATIENT)
Dept: ULTRASOUND IMAGING | Facility: CLINIC | Age: 57
End: 2022-02-23
Payer: COMMERCIAL

## 2022-02-23 ENCOUNTER — TRANSCRIPTION ENCOUNTER (OUTPATIENT)
Age: 57
End: 2022-02-23

## 2022-02-23 DIAGNOSIS — Z00.00 ENCOUNTER FOR GENERAL ADULT MEDICAL EXAMINATION WITHOUT ABNORMAL FINDINGS: ICD-10-CM

## 2022-02-23 DIAGNOSIS — M79.89 OTHER SPECIFIED SOFT TISSUE DISORDERS: ICD-10-CM

## 2022-02-23 PROCEDURE — 93971 EXTREMITY STUDY: CPT | Mod: 26,RT

## 2022-02-23 PROCEDURE — 93971 EXTREMITY STUDY: CPT

## 2022-03-14 ENCOUNTER — APPOINTMENT (OUTPATIENT)
Dept: ORTHOPEDIC SURGERY | Facility: CLINIC | Age: 57
End: 2022-03-14

## 2022-06-26 ENCOUNTER — EMERGENCY (EMERGENCY)
Facility: HOSPITAL | Age: 57
LOS: 1 days | Discharge: HOME | End: 2022-06-26
Attending: EMERGENCY MEDICINE
Payer: SELF-PAY

## 2022-06-26 VITALS
SYSTOLIC BLOOD PRESSURE: 150 MMHG | OXYGEN SATURATION: 98 % | HEART RATE: 80 BPM | DIASTOLIC BLOOD PRESSURE: 80 MMHG | TEMPERATURE: 99 F | HEIGHT: 72 IN | WEIGHT: 212.97 LBS | RESPIRATION RATE: 18 BRPM

## 2022-06-26 PROCEDURE — 99053 MED SERV 10PM-8AM 24 HR FAC: CPT

## 2022-06-26 PROCEDURE — 73030 X-RAY EXAM OF SHOULDER: CPT | Mod: 26,RT

## 2022-06-26 PROCEDURE — 99283 EMERGENCY DEPT VISIT LOW MDM: CPT | Mod: 25

## 2022-06-26 PROCEDURE — 73030 X-RAY EXAM OF SHOULDER: CPT

## 2022-06-26 PROCEDURE — 99284 EMERGENCY DEPT VISIT MOD MDM: CPT

## 2022-06-26 RX ORDER — IBUPROFEN 200 MG
600 TABLET ORAL ONCE
Refills: 0 | Status: DISCONTINUED | OUTPATIENT
Start: 2022-06-26 | End: 2022-07-01

## 2022-06-26 RX ORDER — METHOCARBAMOL 500 MG/1
1500 TABLET, FILM COATED ORAL ONCE
Refills: 0 | Status: DISCONTINUED | OUTPATIENT
Start: 2022-06-26 | End: 2022-07-01

## 2022-06-26 NOTE — ED PROVIDER NOTE - PATIENT PORTAL LINK FT
You can access the FollowMyHealth Patient Portal offered by [facility] by registering at the following website: [email or web address]. By joining [employer]’s FollowMyHealth portal, you will also be able to view your health information using other applications (apps) compatible with our system.
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
## 2022-06-26 NOTE — ED PROVIDER NOTE - NS ED ATTENDING STATEMENT MOD
This was a shared visit with the NHAN. I reviewed and verified the documentation and independently performed the documented:
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
## 2022-06-26 NOTE — ED ADULT TRIAGE NOTE - DOMESTIC TRAVEL HIGH RISK QUESTION
"    [facility] Medicine Services  HISTORY AND PHYSICAL    Date of Admission: 7/2/2018  Primary Care Physician: Jose Moon MD    Subjective     Chief Complaint: \"Just not feeling well\"    History of Present Illness  Patient is a 65-year-old  male with past medical history significant for chronic obstructive pulmonary disease with home oxygen dependency that presented to our hospital today with a chief complaint of weakness, shortness of breath, and recent history of falls.  Patient does have a history of tobacco dependence and continues to smoke.  He reports is not been feeling well over the course of the past few days, and in fact his symptoms have progressively gotten worse.  He denies any fevers, but does report some occasional chills.  He reports a cough productive of thick, viscous, gray sputum.  He denies any known aspiration events.  He reports no recent nausea or vomiting.  He denies any abdominal pain or diarrhea.  He does report having some ongoing weight loss of unclear amount.  He lives by himself, and reports that he has felt very weak recently which is resulted into falls, without injury per his report.  He reports no current pain.  He denies having any chest pain or chest pressure.      Review of Systems     Otherwise complete ROS reviewed and negative except as mentioned in the HPI.    Past Medical History:   Past Medical History:   Diagnosis Date   • Anxiety    • Arthritis    • COPD (chronic obstructive pulmonary disease) (CMS/HCC)    • Depression    • Hypertension    • Nephrolithiasis      Past Surgical History:  Past Surgical History:   Procedure Laterality Date   • COLONOSCOPY  04/20/2007    Within Normal Limits.   • ENDOSCOPY  04/20/2007    urea neg, sbbx benign, mild gastritis, small hiatal hernia   • ENDOSCOPY N/A 10/5/2016    Procedure: ESOPHAGOGASTRODUODENOSCOPY WITH ANESTHESIA;  Surgeon: Kevyn Damon MD;  Location: [facility]" "ENDOSCOPY;  Service:    • HEMORRHOIDECTOMY     • PEG TUBE INSERTION  07/22/2016    Dr Mccarty   • TRACHEOSTOMY       Social History:  reports that he has been smoking Cigarettes.  He has been smoking about 0.50 packs per day. He has never used smokeless tobacco. He reports that he does not drink alcohol.    Family History: patient unaware of any significant past family history    Allergies:  No Known Allergies  Medications:  Prior to Admission medications    Medication Sig Start Date End Date Taking? Authorizing Provider   ALPRAZolam (XANAX) 1 MG tablet Take 1 mg by mouth 3 (Three) Times a Day As Needed for anxiety.    Historical Provider, MD   amitriptyline (ELAVIL) 50 MG tablet Take 50 mg by mouth Every Night.    Historical Provider, MD   citalopram (CeleXA) 20 MG tablet Take 20 mg by mouth Daily.    Historical Provider, MD   ipratropium-albuterol (DUO-NEB) 0.5-2.5 mg/mL nebulizer Take 3 mL by nebulization Every 6 (Six) Hours As Needed for wheezing.    Historical Provider, MD   metoprolol tartrate (LOPRESSOR) 50 MG tablet Take 50 mg by mouth 2 (Two) Times a Day.    Historical Provider, MD   pantoprazole (PROTONIX) 40 MG EC tablet Take 40 mg by mouth Daily.    Historical Provider, MD   Probiotic Product (PROBIOTIC ADVANCED PO) Take 1 tablet by mouth Daily.    Historical Provider, MD   tiotropium (SPIRIVA) 18 MCG per inhalation capsule Place 1 capsule into inhaler and inhale Daily.    Historical Provider, MD   warfarin (COUMADIN) 2 MG tablet Take 2 mg by mouth Daily.    Historical Provider, MD     Objective     Vital Signs: /68 (BP Location: Right arm, Patient Position: Lying)   Pulse (!) 123   Temp 98.8 °F (37.1 °C)   Resp 18   Ht 165.1 cm (65\")   Wt 49.6 kg (109 lb 6.4 oz)   SpO2 96%   BMI 18.21 kg/m²   Physical Exam   Constitutional: He is oriented to person, place, and time. No distress.   Thin and frail appearing   HENT:   Head: Normocephalic.   Mouth/Throat: No oropharyngeal exudate.   Eyes: Pupils " are equal, round, and reactive to light. No scleral icterus.   Neck: No tracheal deviation present.   Old trach scar   Cardiovascular: Regular rhythm.    Rate approx 110-120   Pulmonary/Chest: Effort normal. No respiratory distress. He has wheezes.   Scattered coarse BSs worse on the left as compared to the right; currently on 3LNC   Abdominal: Soft. He exhibits no distension. There is no tenderness.   Musculoskeletal: He exhibits no edema.   Neurological: He is alert and oriented to person, place, and time.   Skin: Skin is warm and dry. He is not diaphoretic.   Psychiatric: He has a normal mood and affect. His behavior is normal.   Vitals reviewed.      Results Reviewed:  Lab Results (last 24 hours)     Procedure Component Value Units Date/Time    Blood Culture - Blood, Blood, Venous Line [[id number]] Collected:  07/02/18 1246    Specimen:  Blood from Hand, Right Updated:  07/02/18 1424    Blood Culture - Blood, Blood, Venous Line [[id number]] Collected:  07/02/18 1246    Specimen:  Blood from Arm, Right Updated:  07/02/18 1423    CBC & Differential [[id number]] Collected:  07/02/18 1246    Specimen:  Blood Updated:  07/02/18 1347    Narrative:       The following orders were created for panel order CBC & Differential.  Procedure                               Abnormality         Status                     ---------                               -----------         ------                     Manual Differential[[id number]]          Abnormal            Final result               CBC Auto Differential[[id number]]        Abnormal            Final result                 Please view results for these tests on the individual orders.    Manual Differential [[id number]]  (Abnormal) Collected:  07/02/18 1246    Specimen:  Blood Updated:  07/02/18 1347     Neutrophil % 97.0 (H) %      Lymphocyte % 1.0 (L) %      Monocyte % 2.0 (L) %      Neutrophils Absolute 29.57 (H) 10*3/mm3      Lymphocytes Absolute 0.30 (L) 10*3/mm3       Monocytes Absolute 0.61 10*3/mm3      Anisocytosis Slight/1+     Hypochromia Slight/1+     Microcytes Mod/2+     Poikilocytes Slight/1+     Schistocytes Slight/1+     WBC Morphology Normal     Platelet Estimate Increased    CBC Auto Differential [[id number]]  (Abnormal) Collected:  07/02/18 1246    Specimen:  Blood Updated:  07/02/18 1347     WBC 30.48 (C) 10*3/mm3      RBC 4.95 10*6/mm3      Hemoglobin 10.3 (L) g/dL      Hematocrit 35.1 (L) %      MCV 70.9 (L) fL      MCH 20.8 (L) pg      MCHC 29.3 (L) g/dL      RDW 16.4 (H) %      RDW-SD 41.3 fl      MPV 9.1 fL      Platelets 493 (H) 10*3/mm3     Procalcitonin [[id number]]  (Abnormal) Collected:  07/02/18 1246    Specimen:  Blood Updated:  07/02/18 1332     Procalcitonin 0.35 (H) ng/mL     Narrative:       SIRS, sepsis, severe sepsis, and septic shock are categorized according to the criteria of the consensus conference of the American College of Chest Physicians/Society of Critical Care Medicine.    PCT < 0.5 ng/mL     Systemic infection (sepsis) is not likely.    PCT >0.5 and < 2.0 ng/mL Systemic infection (sepsis) is possible, but other conditions are known to elevate PCT as well.    PCT > 2.0 ng/mL     Systemic infection (sepsis) is likely, unless other causes are known.      PCT > 10.0 ng/mL    Important systemic inflammatory response, almost exclusively due to severe bacterial sepsis or septic shock.    PCT values of < 0.5 ng/mL do not exclude an infection, because localized infections (without systemic signs) may be associated with such low concentrations, or a systemic infection in its initial stages (<6 hours).  Increased PCT can occur without infection.  PCT concentrations between 0.5 and 2.0 ng/mL should be interpreted taking into account the patients history.  It is recommended to retest PCT within 6-24 hours if any concentrations < 2.0 ng/mL are obtained.    Troponin [[id number]]  (Abnormal) Collected:  07/02/18 1246    Specimen:  Blood Updated:   07/02/18 1329     Troponin I 0.190 (H) ng/mL     BNP [[id number]]  (Normal) Collected:  07/02/18 1246    Specimen:  Blood Updated:  07/02/18 1329     proBNP 570.0 pg/mL     Comprehensive Metabolic Panel [[id number]]  (Abnormal) Collected:  07/02/18 1246    Specimen:  Blood Updated:  07/02/18 1315     Glucose 130 (H) mg/dL      BUN 21 mg/dL      Creatinine 0.44 (L) mg/dL      Sodium 137 mmol/L      Potassium 4.6 mmol/L      Chloride 90 (L) mmol/L      CO2 37.0 (H) mmol/L      Calcium 9.5 mg/dL      Total Protein 7.3 g/dL      Albumin 3.40 (L) g/dL      ALT (SGPT) 69 (H) U/L      AST (SGOT) 60 (H) U/L      Alkaline Phosphatase 228 (H) U/L      Total Bilirubin 0.5 mg/dL      eGFR Non African Amer >150 mL/min/1.73      Globulin 3.9 gm/dL      A/G Ratio 0.9 (L) g/dL      BUN/Creatinine Ratio 47.7 (H)     Anion Gap 10.0 mmol/L     Protime-INR [[id number]]  (Abnormal) Collected:  07/02/18 1246    Specimen:  Blood Updated:  07/02/18 1312     Protime 15.5 (H) Seconds      INR 1.19 (H)    aPTT [[id number]]  (Abnormal) Collected:  07/02/18 1246    Specimen:  Blood Updated:  07/02/18 1312     PTT 41.6 (H) seconds     D-dimer, Quantitative [[id number]]  (Abnormal) Collected:  07/02/18 1246    Specimen:  Blood Updated:  07/02/18 1312     D-Dimer, Quantitative 2.35 (H) mg/L (FEU)     Narrative:       Reference Range is 0-0.50 mg/L FEU. However, results <0.50 mg/L FEU tends to rule out DVT or PE. Results >0.50 mg/L FEU are not useful in predicting absence or presence of DVT or PE.    Lactic Acid, Plasma [[id number]]  (Normal) Collected:  07/02/18 1246    Specimen:  Blood Updated:  07/02/18 1312     Lactate 1.6 mmol/L     Blood Gas, Arterial [[id number]]  (Abnormal) Collected:  07/02/18 1230    Specimen:  Arterial Blood Updated:  07/02/18 1241     Site Left Brachial     Kervin's Test Positive     pH, Arterial 7.410 pH units      pCO2, Arterial 59.2 (H) mm Hg      pO2, Arterial 61.9 (L) mm Hg      HCO3, Arterial 37.5 (H) mmol/L      Base  Excess, Arterial 11.0 (H) mmol/L      O2 Saturation, Arterial 92.4 (L) %      Temperature 37.0 C      Barometric Pressure for Blood Gas 751 mmHg      Modality Nasal Cannula     Flow Rate 2.0 lpm      Ventilator Mode NA     Collected by [id number]        Imaging Results (last 24 hours)     Procedure Component Value Units Date/Time    CT Angiogram Chest With Contrast [[id number]] Collected:  07/02/18 1334     Updated:  07/02/18 1345    Narrative:       History:  65-year-old with shortness of breath and tachycardia. History of  pulmonary embolus.      Reference:  CTA chest August 2016.     Technique:  Following the administration of intravenous contrast, helical  acquisition was obtained from the thoracic inlet through the diaphragm  during the arterial phase. Multiplanar reconstructed images including 3D  MIP were obtained.     For this CT exam, one or more of the following dose reduction techniques  was employed:  -automated exposure control  -mA and/or kVp adjustment for patient size  -iterative reconstruction      mGy-cm     Findings:     Vascular findings:  No pulmonary embolus is identified. No thoracic aortic aneurysm or  dissection. Atherosclerosis of the thoracic aorta. The heart is not  enlarged. There is a small pericardial effusion.     Nonvascular findings:  No axillary adenopathy. There is mediastinal lymphadenopathy. For  reference and aorticopulmonary window node measures 2 x 1 cm. Is  abnormal bilateral hilar jannie tissue as well. Granulomas no  calcification subcarinal region.     There is significant presumed aspirate in the trachea and mainstem  bronchi.     There is extensive consolidation throughout the left lung. Chronic  consolidation/scarring involves the right upper lobe apex with cystic  change. Severe pulmonary emphysema and architectural distortion is  noted. No pleural effusion or pneumothorax.     Imaging through the upper abdomen reveals no acute process.     No acute osseous  abnormalities.          Impression:       1. No pulmonary embolus.  2. Extensive aspiration pneumonia throughout the left lung. Large amount  of aspirated material in the trachea and mainstem bronchi.  3. Follow-up CT chest in 1-2 months is needed to ensure resolution and  exclude an underlying left lung neoplasm.  4. Mediastinal and hilar adenopathy is probably reactive but attention  at follow-up is needed.           This report was finalized on 07/02/2018 13:41 by Dr Beto Mendenhall, .    XR Hip With or Without Pelvis 2 - 3 View Right [[id number]] Collected:  07/02/18 1332     Updated:  07/02/18 1337    Narrative:       History:  65-year-old with right-sided low back pain. Fall.     Reference:  KUB July 2016.     FINDINGS:  Frontal view the pelvis with 2 views of the right hip.     The pelvic ring is intact. No fracture or dislocation of the right hip.  Joint spaces maintained. Limited view of the left hip is unremarkable.          Impression:       No fractures identified.  This report was finalized on 07/02/2018 13:34 by Dr Beto Mendenhall, .    XR Chest 1 View [[id number]] Collected:  07/02/18 1329     Updated:  07/02/18 1334    Narrative:       XR CHEST 1 VW- 7/2/2018 12:37 PM CDT     HISTORY: Cough and shortness of air     COMPARISON: 8/29/2016.     FINDINGS:   Bilateral airspace opacities are noted. Volume loss is seen in the RIGHT  lung. Interstitial and airspace opacities in the LEFT lung have  significantly increased since the previous study. This is seen on a  background of moderate to marked emphysema. The cardiomediastinal  silhouette and pulmonary vascularity are unchanged.      The osseous structures and surrounding soft tissues demonstrate no acute  abnormality.       Impression:       1. Diffuse opacities in the LEFT lung are new since the prior exam and  could be due to pneumonia superimposed upon chronic emphysema. Worsening  pulmonary fibrosis can cause a similar appearance.  2. Progressive scarring in the  RIGHT upper lobe is noted. There is  compensatory hyperexpansion of the RIGHT lower lobe.  This report was finalized on 07/02/2018 13:31 by Dr. Kel Purcell MD.        I have personally reviewed and interpreted the radiology studies and ECG obtained at time of admission.     Assessment / Plan     Assessment:   Hospital Problem List     Aspiration pneumonia of left lung        Assessment:  1.  Acute on chronic hypoxemic respiratory failure  2.  Sepsis  3.  Pneumonia - aspiration  4.  COPD with acute exacerbation; appears to have advanced COPD/emphysema with home 02 dependency  5.  Weight Loss  6.  Elevated troponin - no cheset pain  7.  Abnormal LFTs  8.  History of PE in the past - now off of anticoagulation  9.  Concern for protein-calorie malnutrition  10.  Tobacco dependence    11.  MICHELLE    Plan:   1.  IV Zosyn and Levaquin  2.  IV Solumedrol  3.  Scheduled nebs; consider Mucomyst nebs  4.  Mucinex  5.  Supp 02  6.  Respiratory culture  7.  IVFs  8.  Nutrition consult  9.  NPO  10.  Speech therapy evaluation  11.  Trend troponin  12.  Lovenox at PPX dose for now  13.  PO BBlocker  14.  Continue outpatient CPAP per home settings  15.  Workup ongoing      Adrien Merrill MD   07/02/18   3:25 PM           No

## 2022-06-27 RX ORDER — METHOCARBAMOL 500 MG/1
1 TABLET, FILM COATED ORAL
Qty: 9 | Refills: 0
Start: 2022-06-27 | End: 2022-06-29
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
## 2022-12-06 NOTE — ED PROVIDER NOTE - ATTENDING CONTRIBUTION TO CARE
High 50 yo F c/o R knee pain and swelling after using a rowing machine at gym.  No assoc calf pain, CP or SOB.  On exam R knee with mild STS, with pain on AROM, NVI, + distal pulses, X-ray neg.  X-ray RICE, NSAIDs and Ortho f/u as outpt

## 2023-04-12 NOTE — ED ADULT NURSE NOTE - SWALLOW TEST 2 - 5ML WATER REPEAT
Doxycycline Pregnancy And Lactation Text: This medication is Pregnancy Category D and not consider safe during pregnancy. It is also excreted in breast milk but is considered safe for shorter treatment courses. Pass/Continue/Ability to swallow - YES

## 2023-05-31 ENCOUNTER — NON-APPOINTMENT (OUTPATIENT)
Age: 58
End: 2023-05-31

## 2023-06-20 NOTE — ED ADULT NURSE NOTE - NS ED NURSE DC TEACHING
Behavioral Health Consultation  Merna Temple M.A. Psychology Assistant  Sera Hunt, Ph.D. Supervising Psychologist  6/20/2023  4:34 PM EDT      Time spent with Patient: 22 minutes  This is patient's third [facility] appointment. Reason for Consult:    Chief Complaint   Patient presents with    Anxiety    Stress       Feedback given to PCP. TELEHEALTH VISIT -- Audio Only  }  Services were provided through a phone call discussion to substitute for in-person clinic visit. Pt gave verbal informed consent to participate in telehealth services. Consent:  She and/or health care decision maker is aware that that she may receive a bill for this telephone service, depending on her insurance coverage, and has provided verbal consent to proceed: Yes    Conducted a risk-benefit analysis and determined that the patient's presenting problems are consistent with the use of telepsychology. Determined that the patient has sufficient knowledge and skills in the use of technology enabling them to adequately benefit from telepsychology. It was determined that this patient was able to be properly treated without an in-person session. Patient verified that they were currently located at the [facility] address that was provided during registration. Verified the following information:  Patient's identification: Yes  Patient location: [address] [place] [address]  Patient's call back number: [phone]  Patient's emergency contact's name and number, as well as permission to contact them if needed: Extended Emergency Contact Information  Primary Emergency Contact: [address]  Mobile Phone: [phone]  Relation: Parent  Preferred language: English  Secondary Emergency Contact: ALVARO BUENROSTRO  Relation: Parent  Preferred language: English   needed?  No    Provider location: [address] affirm this is an episode with a patient who has not had a related appointment within my department in the past 7 days or Orthopedic

## 2023-09-02 ENCOUNTER — EMERGENCY (EMERGENCY)
Facility: HOSPITAL | Age: 58
LOS: 1 days | Discharge: HOME | End: 2023-09-02
Attending: EMERGENCY MEDICINE
Payer: COMMERCIAL

## 2023-09-02 VITALS
SYSTOLIC BLOOD PRESSURE: 131 MMHG | HEIGHT: 64 IN | WEIGHT: 220.02 LBS | DIASTOLIC BLOOD PRESSURE: 86 MMHG | RESPIRATION RATE: 16 BRPM | HEART RATE: 87 BPM | OXYGEN SATURATION: 98 % | TEMPERATURE: 98 F

## 2023-09-02 PROCEDURE — 99283 EMERGENCY DEPT VISIT LOW MDM: CPT

## 2023-09-02 PROCEDURE — 96372 THER/PROPH/DIAG INJ SC/IM: CPT

## 2023-09-02 PROCEDURE — 99284 EMERGENCY DEPT VISIT MOD MDM: CPT

## 2023-09-02 RX ORDER — LIDOCAINE 4 G/100G
1 CREAM TOPICAL
Qty: 1 | Refills: 0
Start: 2023-09-02

## 2023-09-02 RX ORDER — ACETAMINOPHEN 500 MG
2 TABLET ORAL
Qty: 20 | Refills: 0
Start: 2023-09-02

## 2023-09-02 RX ORDER — METHOCARBAMOL 500 MG/1
2 TABLET, FILM COATED ORAL
Qty: 30 | Refills: 0
Start: 2023-09-02 | End: 2023-09-06

## 2023-09-02 RX ORDER — ACETAMINOPHEN 500 MG
975 TABLET ORAL ONCE
Refills: 0 | Status: COMPLETED | OUTPATIENT
Start: 2023-09-02 | End: 2023-09-02

## 2023-09-02 RX ORDER — IBUPROFEN 200 MG
1 TABLET ORAL
Qty: 20 | Refills: 0
Start: 2023-09-02

## 2023-09-02 RX ORDER — KETOROLAC TROMETHAMINE 30 MG/ML
15 SYRINGE (ML) INJECTION ONCE
Refills: 0 | Status: DISCONTINUED | OUTPATIENT
Start: 2023-09-02 | End: 2023-09-02

## 2023-09-02 RX ORDER — LIDOCAINE 4 G/100G
1 CREAM TOPICAL ONCE
Refills: 0 | Status: COMPLETED | OUTPATIENT
Start: 2023-09-02 | End: 2023-09-02

## 2023-09-02 RX ORDER — METHOCARBAMOL 500 MG/1
1500 TABLET, FILM COATED ORAL ONCE
Refills: 0 | Status: COMPLETED | OUTPATIENT
Start: 2023-09-02 | End: 2023-09-02

## 2023-09-02 RX ADMIN — LIDOCAINE 1 PATCH: 4 CREAM TOPICAL at 09:44

## 2023-09-02 RX ADMIN — METHOCARBAMOL 1500 MILLIGRAM(S): 500 TABLET, FILM COATED ORAL at 09:44

## 2023-09-02 RX ADMIN — Medication 15 MILLIGRAM(S): at 09:43

## 2023-09-02 RX ADMIN — Medication 975 MILLIGRAM(S): at 09:44

## 2023-09-02 NOTE — ED STATDOCS - ATTENDING APP SHARED VISIT CONTRIBUTION OF CARE
Artem: I performed a face to face bedside interview with patient regarding history of present illness, review of symptoms and past medical history. I completed an independent physical exam and ordered tests/medications as needed.  I have discussed patient's plan of care with advanced care provider. The advanced care provider assisted in  executing the discussed plan. I was available for any questions or issues that may have arose during the execution of the plan of care.

## 2023-09-02 NOTE — ED STATDOCS - NSFOLLOWUPINSTRUCTIONS_ED_ALL_ED_FT
- Return to the ED for any new or worsening symptoms.   - Follow up with your doctor within 2-3 days.   - May take prescribed medications as directed  - Apply warm compresses and/or heating pads to affected area

## 2023-09-02 NOTE — ED STATDOCS - PROGRESS NOTE DETAILS
PLACIDO Santiago: PT evaluated by intake physician. HPI/PE/ROS as noted above. Will follow up plan per intake physician. pt reporting improvement in pain to L trapezius. able to move neck with greater ease. meds sent to pharmacy

## 2023-09-02 NOTE — ED STATDOCS - PATIENT PORTAL LINK FT
You can access the FollowMyHealth Patient Portal offered by [facility] by registering at the following website: [email or web address]. By joining [facility]’s FollowMyHealth portal, you will also be able to view your health information using other applications (apps) compatible with our system.

## 2023-09-02 NOTE — ED STATDOCS - PHYSICAL EXAMINATION
Gen: No acute distress, non toxic  HEENT: Mucous membranes moist, pink conjunctivae, EOMI  CV: RRR, nl s1/s2.  Resp: CTAB, normal rate and effort  GI: Abdomen soft, NT, ND. No rebound, no guarding  : No CVAT  Neuro: A&O x 3, moving all 4 extremities  MSK: (+) tenderness along left trapezius with full ROM, (+) slight tenderness with small bruise to left plantar foot. No spine or joint tenderness to palpation  Skin: (+) several isolated scattered nontender papules to chest and inner thighs. Intact and perfused.

## 2023-09-02 NOTE — ED ADULT TRIAGE NOTE - NS ED NURSE DIRECT TO ROOM YN
[FreeTextEntry1] : 43 year old F who presents for follow up with low back pain.  In the interim since her last visit, patient reports that she has participated in PT and HEP.  Patient has also been compliant with recommendations for medrol dose pack.  Patient reports significant improvement in her pain with these interventions. Pain went from a 10/10 in severity to a 4-5/10 in severity.  Patient denies new trauma or falls.   Patient denies new weakness, numbness or paresthesia.  Patient denies bowel/bladder dysfunction, fevers, chills, weight loss, night pain, or night sweats.\par 
Yes
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
## 2023-09-05 NOTE — ED PROVIDER NOTE - NS ED ATTENDING STATEMENT MOD
Attending with Colchicine Counseling:  Patient counseled regarding adverse effects including but not limited to stomach upset (nausea, vomiting, stomach pain, or diarrhea).  Patient instructed to limit alcohol consumption while taking this medication.  Colchicine may reduce blood counts especially with prolonged use.  The patient understands that monitoring of kidney function and blood counts may be required, especially at baseline. The patient verbalized understanding of the proper use and possible adverse effects of colchicine.  All of the patient's questions and concerns were addressed.

## 2023-12-08 ENCOUNTER — APPOINTMENT (OUTPATIENT)
Dept: ORTHOPEDIC SURGERY | Facility: CLINIC | Age: 58
End: 2023-12-08
Payer: OTHER MISCELLANEOUS

## 2023-12-08 VITALS — WEIGHT: 220 LBS | BODY MASS INDEX: 37.56 KG/M2 | HEIGHT: 64 IN

## 2023-12-08 DIAGNOSIS — E11.9 TYPE 2 DIABETES MELLITUS W/OUT COMPLICATIONS: ICD-10-CM

## 2023-12-08 DIAGNOSIS — Z78.9 OTHER SPECIFIED HEALTH STATUS: ICD-10-CM

## 2023-12-08 PROCEDURE — 99203 OFFICE O/P NEW LOW 30 MIN: CPT | Mod: 25

## 2023-12-08 PROCEDURE — 20610 DRAIN/INJ JOINT/BURSA W/O US: CPT | Mod: 50

## 2023-12-08 PROCEDURE — J3490M: CUSTOM

## 2023-12-08 PROCEDURE — 73564 X-RAY EXAM KNEE 4 OR MORE: CPT | Mod: 50

## 2023-12-08 RX ORDER — MELOXICAM 15 MG/1
15 TABLET ORAL DAILY
Qty: 30 | Refills: 3 | Status: ACTIVE | COMMUNITY
Start: 2023-12-08 | End: 1900-01-01

## 2024-02-08 NOTE — ED ADULT NURSE NOTE - CAS ELECT INFOMATION PROVIDED
Patient called to reschedule her follow up appointment with Dr. Pickett (rescheduled for 4/25/24). Patient is requesting that her lab orders be extended so she can have them drawn prior to her new appointment.   Pt seen and discharged by Pepe CUMMINS only./DC instructions

## 2024-02-16 ENCOUNTER — APPOINTMENT (OUTPATIENT)
Dept: ORTHOPEDIC SURGERY | Facility: CLINIC | Age: 59
End: 2024-02-16

## 2024-03-08 ENCOUNTER — APPOINTMENT (OUTPATIENT)
Dept: ORTHOPEDIC SURGERY | Facility: CLINIC | Age: 59
End: 2024-03-08
Payer: OTHER MISCELLANEOUS

## 2024-03-08 VITALS — HEIGHT: 64 IN | BODY MASS INDEX: 37.56 KG/M2 | WEIGHT: 220 LBS

## 2024-03-08 PROCEDURE — 99214 OFFICE O/P EST MOD 30 MIN: CPT | Mod: 25

## 2024-03-08 PROCEDURE — J3490M: CUSTOM

## 2024-03-08 PROCEDURE — 20610 DRAIN/INJ JOINT/BURSA W/O US: CPT | Mod: 50

## 2024-03-08 RX ORDER — IBUPROFEN 800 MG/1
800 TABLET, FILM COATED ORAL 3 TIMES DAILY
Qty: 90 | Refills: 2 | Status: ACTIVE | COMMUNITY
Start: 2024-03-08 | End: 1900-01-01

## 2024-03-08 NOTE — DISCUSSION/SUMMARY
[de-identified] : Gracie has bilateral knee medial compartment arthritis.  She requested bilateral knee steroid injections today which were performed and she tolerated well.  She had good relief from these last time but would like to pursue gel injections in the future.  We did discuss that the repeat steroid injections tend to be less effective.  We placed an order for bilateral knee Hymovis injections today.  She will follow-up in 6 weeks.  All questions answered she is in agreement with this plan.

## 2024-03-08 NOTE — HISTORY OF PRESENT ILLNESS
[8] : 8 [Burning] : burning [Dull/Aching] : dull/aching [Sharp] : sharp [Shooting] : shooting [Constant] : constant [Household chores] : household chores [Leisure] : leisure [Work] : work [Rest] : rest [Meds] : meds [Heat] : heat [Sitting] : sitting [Walking] : walking [Stairs] : stairs [Full time] : Work status: full time [de-identified] : Gait: Non-antalgic Alignment: Neutral Scars: None  NWA here for fall on BL knee 10/11/23 states shes an 8 on the pain scale BL Knee: Tenderness:yes ROM: 0-120 degrees Crepitus: None Effusion: None Warmth: None   Meniscal Signs: Pain on terminal extension: yes Pain on terminal flexion: yes McMurrays: Neg Thessaly's: Neg   Ligament Exam: Lachman: neg Post Drawer: neg Valgus stress: neg at 0 and 30 degrees Varus stress: neg at 0 and 30 degrees Pivot shift: neg Dial test: neg at 30 degrees, neg at 90 degrees   Strength: Quads: 5/5 Hamstrin/5 DF/PF/EHL 5/5   Sensation grossly intact in all dermatomes, DP/PT 2+, brisk capillary refill distally  3/8/24 Pt here for WCFU of BL knees today. States she was feeling ok until these past 2 weeks the pain has been worse.  States she did not start PT. [] : Post Surgical Visit: no [FreeTextEntry5] : pt states she has trouble bending/ squating and going up stairs. can not sit for too long.  DOI 10/11/23 [FreeTextEntry9] : [id number]/ muscle relaxer. 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

## 2024-03-08 NOTE — PROCEDURE
[Large Joint Injection] : Large joint injection [Bilateral] : bilaterally of the [Knee] : knee [Pain] : pain [Alcohol] : alcohol [Betadine] : betadine [___ cc    0.5%] : Bupivacaine (Marcaine) ~Vcc of 0.5%  [___ cc    40mg] : Triamcinolone (Kenalog) ~Vcc of 40 mg  [Patient had decreased mobility in the joint] : patient had decreased mobility in the joint [Previous OTC use and PT nontherapeutic] : patient has tried OTC's including aspirin, Ibuprofen, Aleve, etc or prescription NSAIDS, and/or exercises at home and/or physical therapy without satisfactory response [Risks, benefits, alternatives discussed / Verbal consent obtained] : the risks benefits, and alternatives have been discussed, and verbal consent was obtained [FreeTextEntry1] : b/l knee CSI [FreeTextEntry2] : pain [FreeTextEntry3] : Risks and benefits of the injection were discussed with the patient including infection, bleeding, allergic reactions, worsening of symptoms, and possible neurovascular damage.  They stated understanding and were agreeable to proceed.  An inferolateral portal was marked at the junction of the inferior and lateral borders of the patella and the Right knee was steriley prepped with betadine and ETOH.  The inferolateral portal was then used to inject 40mg kenalog and 3cc Marcaine.  The injection site was cleaned with ETOH and a sterile bandaid was applied.  This process was then repeated on the contralateral knee.  Patient tolerated the procedure well without any apparent complications.  Counseled on concerning signs/symptoms after injection and to call office should any problems arise.

## 2024-03-08 NOTE — IMAGING
[Bilateral] : knee bilaterally [All Views] : anteroposterior, lateral, skyline, and anteroposterior standing [Isolated medial compartmental OA] : Isolated medial compartmental OA [de-identified] : Gait: Mildly antalgic Alignment: varus Scars: L knee anterior scar from prior patella instability surgery    B/l Knee: Tenderness: medial joint line ROM: 5-100 Crepitus: Present Effusion: Present  Warmth: mild   Ligament Exam:  Lachman: neg  Post Drawer: neg  Valgus stress: + for pain, mild/moderate laxity Varus stress: +for pain, mild/moderate laxity Pivot shift: neg Dial test: neg at 30 degrees, neg at 90 degrees     Strength: Quads: 5/5 Hamstrin/5 DF/PF/EHL 5/5   Sensation grossly intact in all dermatomes, DP/PT 2+, brisk capillary refill distally

## 2024-04-05 ENCOUNTER — APPOINTMENT (OUTPATIENT)
Dept: ORTHOPEDIC SURGERY | Facility: CLINIC | Age: 59
End: 2024-04-05
Payer: OTHER MISCELLANEOUS

## 2024-04-05 VITALS — HEIGHT: 64 IN | WEIGHT: 220 LBS | BODY MASS INDEX: 37.56 KG/M2

## 2024-04-05 PROCEDURE — 99214 OFFICE O/P EST MOD 30 MIN: CPT | Mod: 25

## 2024-04-05 PROCEDURE — 20610 DRAIN/INJ JOINT/BURSA W/O US: CPT | Mod: 50

## 2024-04-05 NOTE — HISTORY OF PRESENT ILLNESS
[8] : 8 [Burning] : burning [Dull/Aching] : dull/aching [Sharp] : sharp [Shooting] : shooting [Constant] : constant [Household chores] : household chores [Leisure] : leisure [Work] : work [Rest] : rest [Meds] : meds [Heat] : heat [Sitting] : sitting [Walking] : walking [Stairs] : stairs [Full time] : Work status: full time [de-identified] : Gait: Non-antalgic Alignment: Neutral Scars: None  NWA here for fall on BL knee 10/11/23 states shes an 8 on the pain scale BL Knee: Tenderness:yes ROM: 0-120 degrees Crepitus: None Effusion: None Warmth: None   Meniscal Signs: Pain on terminal extension: yes Pain on terminal flexion: yes McMurrays: Neg Thessaly's: Neg   Ligament Exam: Lachman: neg Post Drawer: neg Valgus stress: neg at 0 and 30 degrees Varus stress: neg at 0 and 30 degrees Pivot shift: neg Dial test: neg at 30 degrees, neg at 90 degrees   Strength: Quads: 5/5 Hamstrin/5 DF/PF/EHL 5/5   Sensation grossly intact in all dermatomes, DP/PT 2+, brisk capillary refill distally  3/8/24 Pt here for WCFU of BL knees today. States she was feeling ok until these past 2 weeks the pain has been worse.  States she did not start PT.  24 patient is here for WCFU Hym#1 inj for BL knees today.  [] : Post Surgical Visit: no [FreeTextEntry5] : pt states she has trouble bending/ squating and going up stairs. can not sit for too long.  DOI 10/11/23 [FreeTextEntry9] : [id number]/ muscle relaxer. 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

## 2024-04-05 NOTE — PROCEDURE
[FreeTextEntry1] : B/L Knee Herminias [FreeTextEntry2] : pain [FreeTextEntry3] : Risks and benefits of the injection were discussed with the patient including infection, bleeding, allergic reactions, worsening of symptoms, and possible neurovascular damage.  They stated understanding and were agreeable to proceed.  An inferolateral portal was marked at the junction of the inferior and lateral borders of the patella and the R knee was sterilely prepped with betadine and ETOH.  The inferolateral portal was then used to inject Hymovis.  The injection site was cleaned with ETOH and a sterile bandaid was applied.  An identical procedure was performed on the contralateral side. Patient tolerated the procedure well without any apparent complications.  Counseled on concerning signs/symptoms after injection and to call office should any problems arise.

## 2024-04-05 NOTE — IMAGING
[de-identified] : Gait: Mildly antalgic Alignment: Mild varus Scars: L knee medial parapatellar incision    B/L Knee: Tenderness: medial joint line ROM: 5-100 Crepitus: Present Effusion: Present  Warmth: mild   Ligament Exam:  Lachman: neg  Post Drawer: neg  Valgus stress: neg at 0 and 30 degrees Varus stress: neg at 0 and 30 degrees Pivot shift: neg Dial test: neg at 30 degrees, neg at 90 degrees     Strength: Quads: 5/5 Hamstrin/5 DF/PF/EHL 5/5   Sensation grossly intact in all dermatomes, DP/PT 2+, brisk capillary refill distally

## 2024-04-16 ENCOUNTER — APPOINTMENT (OUTPATIENT)
Dept: ORTHOPEDIC SURGERY | Facility: CLINIC | Age: 59
End: 2024-04-16
Payer: OTHER MISCELLANEOUS

## 2024-04-16 VITALS — HEIGHT: 64 IN | BODY MASS INDEX: 37.56 KG/M2 | WEIGHT: 220 LBS

## 2024-04-16 PROCEDURE — 99213 OFFICE O/P EST LOW 20 MIN: CPT | Mod: 25

## 2024-04-16 PROCEDURE — 20610 DRAIN/INJ JOINT/BURSA W/O US: CPT | Mod: 50

## 2024-04-16 NOTE — IMAGING
[de-identified] : Gait: Mildly antalgic Alignment: Mild varus Scars: L knee medial parapatellar incision    B/L Knee: Tenderness: medial joint line ROM: 5-100 Crepitus: Present Effusion: Present  Warmth: mild   Ligament Exam:  Lachman: neg  Post Drawer: neg  Valgus stress: neg at 0 and 30 degrees Varus stress: neg at 0 and 30 degrees Pivot shift: neg Dial test: neg at 30 degrees, neg at 90 degrees     Strength: Quads: 5/5 Hamstrin/5 DF/PF/EHL 5/5   Sensation grossly intact in all dermatomes, DP/PT 2+, brisk capillary refill distally

## 2024-04-16 NOTE — HISTORY OF PRESENT ILLNESS
[8] : 8 [Burning] : burning [Dull/Aching] : dull/aching [Sharp] : sharp [Shooting] : shooting [Constant] : constant [Household chores] : household chores [Leisure] : leisure [Work] : work [Rest] : rest [Meds] : meds [Heat] : heat [Sitting] : sitting [Walking] : walking [Stairs] : stairs [Full time] : Work status: full time [de-identified] : Gait: Non-antalgic Alignment: Neutral Scars: None  NWA here for fall on BL knee 10/11/23 states shes an 8 on the pain scale BL Knee: Tenderness:yes ROM: 0-120 degrees Crepitus: None Effusion: None Warmth: None   Meniscal Signs: Pain on terminal extension: yes Pain on terminal flexion: yes McMurrays: Neg Thessaly's: Neg   Ligament Exam: Lachman: neg Post Drawer: neg Valgus stress: neg at 0 and 30 degrees Varus stress: neg at 0 and 30 degrees Pivot shift: neg Dial test: neg at 30 degrees, neg at 90 degrees   Strength: Quads: 5/5 Hamstrin/5 DF/PF/EHL 5/5   Sensation grossly intact in all dermatomes, DP/PT 2+, brisk capillary refill distally  3/8/24 Pt here for WCFU of BL knees today. States she was feeling ok until these past 2 weeks the pain has been worse.  States she did not start PT.  24 patient is here for WCFU Hym#1 inj for BL knees today.  24 patient is here for Hym #2 inj for BL knees today.  Has not noticed any significant improvement in her pain yet [] : Post Surgical Visit: no [FreeTextEntry5] : pt states she has trouble bending/ squating and going up stairs. can not sit for too long.  DOI 10/11/23 [FreeTextEntry9] : [id number]/ muscle relaxer. 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

## 2024-04-16 NOTE — DISCUSSION/SUMMARY
[de-identified] : Second Hymovis injection was provided today to the bilateral knees which patient tolerated well.  She will continue using anti-inflammatories and modifying her activities.  We also discussed weight loss.  I will see her back in 6 months for recheck.  All questions were answered she is in agreement with this plan.

## 2024-04-19 ENCOUNTER — APPOINTMENT (OUTPATIENT)
Dept: ORTHOPEDIC SURGERY | Facility: CLINIC | Age: 59
End: 2024-04-19

## 2024-05-21 ENCOUNTER — APPOINTMENT (OUTPATIENT)
Dept: ORTHOPEDIC SURGERY | Facility: CLINIC | Age: 59
End: 2024-05-21
Payer: OTHER MISCELLANEOUS

## 2024-05-21 VITALS — WEIGHT: 220 LBS | BODY MASS INDEX: 37.56 KG/M2 | HEIGHT: 64 IN

## 2024-05-21 DIAGNOSIS — M17.0 BILATERAL PRIMARY OSTEOARTHRITIS OF KNEE: ICD-10-CM

## 2024-05-21 PROCEDURE — 99213 OFFICE O/P EST LOW 20 MIN: CPT

## 2024-05-21 NOTE — HISTORY OF PRESENT ILLNESS
[8] : 8 [Burning] : burning [Dull/Aching] : dull/aching [Sharp] : sharp [Shooting] : shooting [Constant] : constant [Household chores] : household chores [Leisure] : leisure [Work] : work [Rest] : rest [Meds] : meds [Heat] : heat [Sitting] : sitting [Walking] : walking [Stairs] : stairs [Full time] : Work status: full time [de-identified] : Gait: Non-antalgic Alignment: Neutral Scars: None  NWA here for fall on BL knee 10/11/23 states shes an 8 on the pain scale BL Knee: Tenderness:yes ROM: 0-120 degrees Crepitus: None Effusion: None Warmth: None   Meniscal Signs: Pain on terminal extension: yes Pain on terminal flexion: yes McMurrays: Neg Thessaly's: Neg   Ligament Exam: Lachman: neg Post Drawer: neg Valgus stress: neg at 0 and 30 degrees Varus stress: neg at 0 and 30 degrees Pivot shift: neg Dial test: neg at 30 degrees, neg at 90 degrees   Strength: Quads: 5/5 Hamstrin/5 DF/PF/EHL 5/5   Sensation grossly intact in all dermatomes, DP/PT 2+, brisk capillary refill distally  3/8/24 Pt here for WCFU of BL knees today. States she was feeling ok until these past 2 weeks the pain has been worse.  States she did not start PT.  24 patient is here for WCFU Hym#1 inj for BL knees today.  24 patient is here for Hym #2 inj for BL knees today.  Has not noticed any significant improvement in her pain yet  24 patient is here for WCFU of her bl knees today. States she has no improvement. States gel shots did not help with pain.  [] : Post Surgical Visit: no [FreeTextEntry5] : pt states she has trouble bending/ squating and going up stairs. can not sit for too long.  DOI 10/11/23 [FreeTextEntry9] : [id number]/ muscle relaxer. 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

## 2024-05-21 NOTE — IMAGING
[de-identified] : Gait: Mildly antalgic Alignment: Mild varus Scars: L knee medial parapatellar incision    B/L Knee: Tenderness: medial joint line ROM: 5-100 Crepitus: Present Effusion: Present  Warmth: mild   Ligament Exam:  Lachman: neg  Post Drawer: neg  Valgus stress: neg at 0 and 30 degrees Varus stress: neg at 0 and 30 degrees Pivot shift: neg Dial test: neg at 30 degrees, neg at 90 degrees     Strength: Quads: 5/5 Hamstrin/5 DF/PF/EHL 5/5   Sensation grossly intact in all dermatomes, DP/PT 2+, brisk capillary refill distally

## 2024-05-21 NOTE — DISCUSSION/SUMMARY
[de-identified] : Gracie has severe medial compartment osteoarthritis in both of her knees.  She has failed conservative care with anti-inflammatories, steroid injections, gel shot injections, physical therapy, and activity modifications.  I ordered an MRI of her bilateral knees to see if she is a candidate for partial knee replacement.  I will see her back after the MRIs are completed.  All questions were answered she is in agreement with this plan.

## 2024-06-04 ENCOUNTER — APPOINTMENT (OUTPATIENT)
Dept: ORTHOPEDIC SURGERY | Facility: CLINIC | Age: 59
End: 2024-06-04

## 2024-07-09 ENCOUNTER — APPOINTMENT (OUTPATIENT)
Dept: ORTHOPEDIC SURGERY | Facility: CLINIC | Age: 59
End: 2024-07-09

## 2024-07-10 ENCOUNTER — OUTPATIENT (OUTPATIENT)
Dept: OUTPATIENT SERVICES | Facility: HOSPITAL | Age: 59
LOS: 1 days | Discharge: HOME | End: 2024-07-10
Payer: COMMERCIAL

## 2024-07-10 VITALS
DIASTOLIC BLOOD PRESSURE: 93 MMHG | OXYGEN SATURATION: 96 % | WEIGHT: 218.04 LBS | SYSTOLIC BLOOD PRESSURE: 145 MMHG | RESPIRATION RATE: 12 BRPM | HEIGHT: 64 IN | TEMPERATURE: 99 F | HEART RATE: 92 BPM

## 2024-07-10 DIAGNOSIS — Z80.0 FAMILY HISTORY OF MALIGNANT NEOPLASM OF DIGESTIVE ORGANS: ICD-10-CM

## 2024-07-10 PROCEDURE — 88305 TISSUE EXAM BY PATHOLOGIST: CPT

## 2024-07-10 PROCEDURE — 88305 TISSUE EXAM BY PATHOLOGIST: CPT | Mod: 26

## 2024-07-12 ENCOUNTER — APPOINTMENT (OUTPATIENT)
Dept: ORTHOPEDIC SURGERY | Facility: CLINIC | Age: 59
End: 2024-07-12
Payer: OTHER MISCELLANEOUS

## 2024-07-12 ENCOUNTER — NON-APPOINTMENT (OUTPATIENT)
Age: 59
End: 2024-07-12

## 2024-07-12 VITALS — HEIGHT: 64 IN | BODY MASS INDEX: 37.56 KG/M2 | WEIGHT: 220 LBS

## 2024-07-12 DIAGNOSIS — M17.0 BILATERAL PRIMARY OSTEOARTHRITIS OF KNEE: ICD-10-CM

## 2024-07-12 LAB — SURGICAL PATHOLOGY STUDY: SIGNIFICANT CHANGE UP

## 2024-07-12 PROCEDURE — 99214 OFFICE O/P EST MOD 30 MIN: CPT | Mod: ACP,25

## 2024-07-12 PROCEDURE — 20610 DRAIN/INJ JOINT/BURSA W/O US: CPT | Mod: 50

## 2024-07-17 DIAGNOSIS — K62.89 OTHER SPECIFIED DISEASES OF ANUS AND RECTUM: ICD-10-CM

## 2024-07-17 DIAGNOSIS — Z80.0 FAMILY HISTORY OF MALIGNANT NEOPLASM OF DIGESTIVE ORGANS: ICD-10-CM

## 2024-07-17 DIAGNOSIS — Z12.11 ENCOUNTER FOR SCREENING FOR MALIGNANT NEOPLASM OF COLON: ICD-10-CM

## 2024-10-15 NOTE — ED ADULT NURSE REASSESSMENT NOTE - PERIPHERAL VASCULAR ED EDEMA
For Your Information     If your provider ordered any imaging for you today. Our pre-scheduling services will be reaching out to you within 2 business days to schedule this. Prescheduling Services can be reached by calling [phone].    If you are in need of a medication refill, please use one of the following options. You can expect your medication to be filled within 2-3 business days.   1. Call your pharmacy for all medication refills and renewals.   2. [email or web address]- [email or web address]  3. Call your providers office    If your provider ordered testing today, you will be notified of your lab results within 3-5 business days and imaging results within 7-14 business days, unless specified otherwise. If you have not received your results within the allotted business days please call your provider's office. Have you signed up for the anchor.travel Víctor, if not please consider doing so to receive results on the víctor as soon as they have been resulted by the system. [email or web address]     Medication Prior Authorizations may take up to 30 days for approval/denial.     You may be receiving a survey.  Please take the time to complete this, as your feedback is very important to us!  We strive to make your experience exceptional, and your comments help us with that goal.  We look forward to hearing from you!    For all future appointments please arrive 15 minutes prior to your scheduled visit.     Patient Contact Center Business Office: assistance with medical billing & financial inquires [phone]              
no

## 2024-10-29 ENCOUNTER — APPOINTMENT (OUTPATIENT)
Dept: ORTHOPEDIC SURGERY | Facility: CLINIC | Age: 59
End: 2024-10-29

## 2025-04-16 ENCOUNTER — EMERGENCY (EMERGENCY)
Facility: HOSPITAL | Age: 60
LOS: 1 days | End: 2025-04-16
Attending: EMERGENCY MEDICINE
Payer: SELF-PAY

## 2025-04-16 VITALS
TEMPERATURE: 97 F | HEART RATE: 74 BPM | HEIGHT: 64 IN | WEIGHT: 184.97 LBS | RESPIRATION RATE: 16 BRPM | DIASTOLIC BLOOD PRESSURE: 93 MMHG | OXYGEN SATURATION: 96 % | SYSTOLIC BLOOD PRESSURE: 147 MMHG

## 2025-04-16 VITALS
HEART RATE: 67 BPM | DIASTOLIC BLOOD PRESSURE: 79 MMHG | SYSTOLIC BLOOD PRESSURE: 129 MMHG | TEMPERATURE: 98 F | OXYGEN SATURATION: 100 % | RESPIRATION RATE: 16 BRPM

## 2025-04-16 LAB
ALBUMIN SERPL ELPH-MCNC: 4.1 G/DL — SIGNIFICANT CHANGE UP (ref 3.3–5.2)
ALP SERPL-CCNC: 58 U/L — SIGNIFICANT CHANGE UP (ref 40–120)
ALT FLD-CCNC: 18 U/L — SIGNIFICANT CHANGE UP
ANION GAP SERPL CALC-SCNC: 14 MMOL/L — SIGNIFICANT CHANGE UP (ref 5–17)
AST SERPL-CCNC: 30 U/L — SIGNIFICANT CHANGE UP
BASOPHILS # BLD AUTO: 0.03 K/UL — SIGNIFICANT CHANGE UP (ref 0–0.2)
BASOPHILS NFR BLD AUTO: 0.6 % — SIGNIFICANT CHANGE UP (ref 0–2)
BILIRUB SERPL-MCNC: 0.5 MG/DL — SIGNIFICANT CHANGE UP (ref 0.4–2)
BUN SERPL-MCNC: 15.5 MG/DL — SIGNIFICANT CHANGE UP (ref 8–20)
CALCIUM SERPL-MCNC: 9.3 MG/DL — SIGNIFICANT CHANGE UP (ref 8.4–10.5)
CHLORIDE SERPL-SCNC: 101 MMOL/L — SIGNIFICANT CHANGE UP (ref 96–108)
CO2 SERPL-SCNC: 26 MMOL/L — SIGNIFICANT CHANGE UP (ref 22–29)
CREAT SERPL-MCNC: 0.78 MG/DL — SIGNIFICANT CHANGE UP (ref 0.5–1.3)
EGFR: 87 ML/MIN/1.73M2 — SIGNIFICANT CHANGE UP
EGFR: 87 ML/MIN/1.73M2 — SIGNIFICANT CHANGE UP
EOSINOPHIL # BLD AUTO: 0.14 K/UL — SIGNIFICANT CHANGE UP (ref 0–0.5)
EOSINOPHIL NFR BLD AUTO: 2.9 % — SIGNIFICANT CHANGE UP (ref 0–6)
GLUCOSE SERPL-MCNC: 92 MG/DL — SIGNIFICANT CHANGE UP (ref 70–99)
HCT VFR BLD CALC: 44.6 % — SIGNIFICANT CHANGE UP (ref 34.5–45)
HGB BLD-MCNC: 14 G/DL — SIGNIFICANT CHANGE UP (ref 11.5–15.5)
IMM GRANULOCYTES # BLD AUTO: 0.01 K/UL — SIGNIFICANT CHANGE UP (ref 0–0.07)
IMM GRANULOCYTES NFR BLD AUTO: 0.2 % — SIGNIFICANT CHANGE UP (ref 0–0.9)
LYMPHOCYTES # BLD AUTO: 1.73 K/UL — SIGNIFICANT CHANGE UP (ref 1–3.3)
LYMPHOCYTES NFR BLD AUTO: 35.5 % — SIGNIFICANT CHANGE UP (ref 13–44)
MCHC RBC-ENTMCNC: 26.4 PG — LOW (ref 27–34)
MCHC RBC-ENTMCNC: 31.4 G/DL — LOW (ref 32–36)
MCV RBC AUTO: 84.2 FL — SIGNIFICANT CHANGE UP (ref 80–100)
MONOCYTES # BLD AUTO: 0.37 K/UL — SIGNIFICANT CHANGE UP (ref 0–0.9)
MONOCYTES NFR BLD AUTO: 7.6 % — SIGNIFICANT CHANGE UP (ref 2–14)
NEUTROPHILS # BLD AUTO: 2.6 K/UL — SIGNIFICANT CHANGE UP (ref 1.8–7.4)
NEUTROPHILS NFR BLD AUTO: 53.2 % — SIGNIFICANT CHANGE UP (ref 43–77)
NRBC # BLD AUTO: 0 K/UL — SIGNIFICANT CHANGE UP (ref 0–0)
NRBC # FLD: 0 K/UL — SIGNIFICANT CHANGE UP (ref 0–0)
NRBC BLD AUTO-RTO: 0 /100 WBCS — SIGNIFICANT CHANGE UP (ref 0–0)
PLATELET # BLD AUTO: 295 K/UL — SIGNIFICANT CHANGE UP (ref 150–400)
PMV BLD: 10.3 FL — SIGNIFICANT CHANGE UP (ref 7–13)
POTASSIUM SERPL-MCNC: 4.7 MMOL/L — SIGNIFICANT CHANGE UP (ref 3.5–5.3)
POTASSIUM SERPL-SCNC: 4.7 MMOL/L — SIGNIFICANT CHANGE UP (ref 3.5–5.3)
PROT SERPL-MCNC: 7.7 G/DL — SIGNIFICANT CHANGE UP (ref 6.6–8.7)
RBC # BLD: 5.3 M/UL — HIGH (ref 3.8–5.2)
RBC # FLD: 13.7 % — SIGNIFICANT CHANGE UP (ref 10.3–14.5)
SODIUM SERPL-SCNC: 141 MMOL/L — SIGNIFICANT CHANGE UP (ref 135–145)
WBC # BLD: 4.88 K/UL — SIGNIFICANT CHANGE UP (ref 3.8–10.5)
WBC # FLD AUTO: 4.88 K/UL — SIGNIFICANT CHANGE UP (ref 3.8–10.5)

## 2025-04-16 PROCEDURE — 71045 X-RAY EXAM CHEST 1 VIEW: CPT

## 2025-04-16 PROCEDURE — 99285 EMERGENCY DEPT VISIT HI MDM: CPT | Mod: 25

## 2025-04-16 PROCEDURE — 96375 TX/PRO/DX INJ NEW DRUG ADDON: CPT

## 2025-04-16 PROCEDURE — 72125 CT NECK SPINE W/O DYE: CPT | Mod: 26

## 2025-04-16 PROCEDURE — 73030 X-RAY EXAM OF SHOULDER: CPT

## 2025-04-16 PROCEDURE — 93010 ELECTROCARDIOGRAM REPORT: CPT

## 2025-04-16 PROCEDURE — 85025 COMPLETE CBC W/AUTO DIFF WBC: CPT

## 2025-04-16 PROCEDURE — 93005 ELECTROCARDIOGRAM TRACING: CPT

## 2025-04-16 PROCEDURE — 80053 COMPREHEN METABOLIC PANEL: CPT

## 2025-04-16 PROCEDURE — 71045 X-RAY EXAM CHEST 1 VIEW: CPT | Mod: 26

## 2025-04-16 PROCEDURE — 99285 EMERGENCY DEPT VISIT HI MDM: CPT

## 2025-04-16 PROCEDURE — 70450 CT HEAD/BRAIN W/O DYE: CPT | Mod: 26

## 2025-04-16 PROCEDURE — 96374 THER/PROPH/DIAG INJ IV PUSH: CPT

## 2025-04-16 PROCEDURE — 36415 COLL VENOUS BLD VENIPUNCTURE: CPT

## 2025-04-16 PROCEDURE — 73030 X-RAY EXAM OF SHOULDER: CPT | Mod: 26,LT

## 2025-04-16 PROCEDURE — 72125 CT NECK SPINE W/O DYE: CPT | Mod: MC

## 2025-04-16 PROCEDURE — 70450 CT HEAD/BRAIN W/O DYE: CPT | Mod: MC

## 2025-04-16 RX ORDER — ACETAMINOPHEN 500 MG/5ML
1000 LIQUID (ML) ORAL ONCE
Refills: 0 | Status: COMPLETED | OUTPATIENT
Start: 2025-04-16 | End: 2025-04-16

## 2025-04-16 RX ORDER — IBUPROFEN 200 MG
1 TABLET ORAL
Qty: 20 | Refills: 0
Start: 2025-04-16 | End: 2025-04-20

## 2025-04-16 RX ORDER — METHOCARBAMOL 500 MG/1
2 TABLET, FILM COATED ORAL
Qty: 18 | Refills: 0
Start: 2025-04-16 | End: 2025-04-18

## 2025-04-16 RX ORDER — KETOROLAC TROMETHAMINE 30 MG/ML
15 INJECTION, SOLUTION INTRAMUSCULAR; INTRAVENOUS ONCE
Refills: 0 | Status: DISCONTINUED | OUTPATIENT
Start: 2025-04-16 | End: 2025-04-16

## 2025-04-16 RX ORDER — METHOCARBAMOL 500 MG/1
750 TABLET, FILM COATED ORAL ONCE
Refills: 0 | Status: COMPLETED | OUTPATIENT
Start: 2025-04-16 | End: 2025-04-16

## 2025-04-16 RX ADMIN — METHOCARBAMOL 750 MILLIGRAM(S): 500 TABLET, FILM COATED ORAL at 10:42

## 2025-04-16 RX ADMIN — Medication 400 MILLIGRAM(S): at 10:42

## 2025-04-16 RX ADMIN — KETOROLAC TROMETHAMINE 15 MILLIGRAM(S): 30 INJECTION, SOLUTION INTRAMUSCULAR; INTRAVENOUS at 14:10

## 2025-04-16 NOTE — ED ADULT TRIAGE NOTE - GLASGOW COMA SCALE: BEST MOTOR RESPONSE, MLM
Pt reports he has been using topicals at home but not too consistent with it\\nRecommended to use topicals consistently to see improvement \\nRecommend anti fungal powder to put on socks and in shoes\\nPt reports he dropped metal on his great left toe, recommended applying topical to area as well
Detail Level: Detailed
(M6) obeys commands
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
## 2025-04-16 NOTE — ED PROVIDER NOTE - PROVIDER TOKENS
PROVIDER:[TOKEN:[[id number]],FOLLOWUP:[Urgent]],PROVIDER:[TOKEN:[[id number]],FOLLOWUP:[Urgent]]
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
## 2025-04-16 NOTE — ED PROVIDER NOTE - CARE PROVIDERS DIRECT ADDRESSES
,DirectAddress_Unknown,[email or web address]

## 2025-04-16 NOTE — ED PROVIDER NOTE - CARE PROVIDER_API CALL
Nir Amaro  Cardiovascular Disease  [address] [place] [address]  Phone: [phone]  Fax: [phone]  Follow Up Time: Urgent    Gavino Frances  Neurology  [address], [address]  [place] [address]  Phone: [phone]  Fax: [phone]  Follow Up Time: Urgent

## 2025-04-16 NOTE — ED PROVIDER NOTE - PATIENT PORTAL LINK FT
You can access the FollowMyHealth Patient Portal offered by [facility] by registering at the following website: [email or web address]. By joining [employer]’s FollowMyHealth portal, you will also be able to view your health information using other applications (apps) compatible with our system.
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
## 2025-04-19 DIAGNOSIS — M54.2 CERVICALGIA: ICD-10-CM

## 2025-04-19 DIAGNOSIS — S13.4XXA SPRAIN OF LIGAMENTS OF CERVICAL SPINE, INITIAL ENCOUNTER: ICD-10-CM

## 2025-04-19 DIAGNOSIS — Z88.8 ALLERGY STATUS TO OTHER DRUGS, MEDICAMENTS AND BIOLOGICAL SUBSTANCES: ICD-10-CM
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
## 2025-04-19 DIAGNOSIS — M25.512 PAIN IN LEFT SHOULDER: ICD-10-CM
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
## 2025-07-24 ENCOUNTER — APPOINTMENT (OUTPATIENT)
Dept: NEUROLOGY | Facility: CLINIC | Age: 60
End: 2025-07-24
Payer: COMMERCIAL

## 2025-07-24 VITALS
HEIGHT: 64 IN | BODY MASS INDEX: 33.12 KG/M2 | SYSTOLIC BLOOD PRESSURE: 128 MMHG | WEIGHT: 194 LBS | TEMPERATURE: 97.9 F | OXYGEN SATURATION: 95 % | HEART RATE: 101 BPM | DIASTOLIC BLOOD PRESSURE: 79 MMHG

## 2025-07-24 DIAGNOSIS — Z80.9 FAMILY HISTORY OF MALIGNANT NEOPLASM, UNSPECIFIED: ICD-10-CM

## 2025-07-24 DIAGNOSIS — R55 SYNCOPE AND COLLAPSE: ICD-10-CM

## 2025-07-24 PROCEDURE — G2211 COMPLEX E/M VISIT ADD ON: CPT | Mod: NC

## 2025-07-24 PROCEDURE — 99205 OFFICE O/P NEW HI 60 MIN: CPT

## 2025-07-24 RX ORDER — ACETAMINOPHEN 650 MG/1
TABLET, FILM COATED, EXTENDED RELEASE ORAL
Refills: 0 | Status: ACTIVE | COMMUNITY

## 2025-07-24 RX ORDER — CYCLOBENZAPRINE HCL 5 MG
TABLET ORAL
Refills: 0 | Status: ACTIVE | COMMUNITY

## 2025-07-24 RX ORDER — IBUPROFEN 200 MG/1
TABLET, FILM COATED ORAL
Refills: 0 | Status: ACTIVE | COMMUNITY

## 2025-08-01 ENCOUNTER — APPOINTMENT (OUTPATIENT)
Dept: NEUROLOGY | Facility: CLINIC | Age: 60
End: 2025-08-01
Payer: COMMERCIAL

## 2025-08-01 PROCEDURE — 95819 EEG AWAKE AND ASLEEP: CPT

## 2025-08-04 ENCOUNTER — TRANSCRIPTION ENCOUNTER (OUTPATIENT)
Age: 60
End: 2025-08-04

## 2025-08-13 ENCOUNTER — NON-APPOINTMENT (OUTPATIENT)
Age: 60
End: 2025-08-13

## 2025-09-02 ENCOUNTER — APPOINTMENT (OUTPATIENT)
Dept: NEUROLOGY | Facility: CLINIC | Age: 60
End: 2025-09-02
Payer: COMMERCIAL

## 2025-09-02 VITALS
WEIGHT: 188 LBS | BODY MASS INDEX: 32.1 KG/M2 | HEIGHT: 64 IN | OXYGEN SATURATION: 95 % | TEMPERATURE: 96.3 F | SYSTOLIC BLOOD PRESSURE: 141 MMHG | HEART RATE: 67 BPM | DIASTOLIC BLOOD PRESSURE: 92 MMHG

## 2025-09-02 DIAGNOSIS — R55 SYNCOPE AND COLLAPSE: ICD-10-CM

## 2025-09-02 PROCEDURE — G2211 COMPLEX E/M VISIT ADD ON: CPT | Mod: NC

## 2025-09-02 PROCEDURE — 99214 OFFICE O/P EST MOD 30 MIN: CPT
